# Patient Record
Sex: MALE | Race: BLACK OR AFRICAN AMERICAN | NOT HISPANIC OR LATINO | Employment: FULL TIME | URBAN - METROPOLITAN AREA
[De-identification: names, ages, dates, MRNs, and addresses within clinical notes are randomized per-mention and may not be internally consistent; named-entity substitution may affect disease eponyms.]

---

## 2021-08-19 ENCOUNTER — OFFICE VISIT (OUTPATIENT)
Dept: FAMILY MEDICINE CLINIC | Facility: CLINIC | Age: 36
End: 2021-08-19

## 2021-08-19 VITALS
TEMPERATURE: 98.2 F | BODY MASS INDEX: 38.36 KG/M2 | HEART RATE: 91 BPM | DIASTOLIC BLOOD PRESSURE: 95 MMHG | WEIGHT: 315 LBS | SYSTOLIC BLOOD PRESSURE: 138 MMHG | OXYGEN SATURATION: 98 % | RESPIRATION RATE: 18 BRPM | HEIGHT: 76 IN

## 2021-08-19 DIAGNOSIS — K61.1 PERIRECTAL ABSCESS: Primary | ICD-10-CM

## 2021-08-19 PROCEDURE — 99204 OFFICE O/P NEW MOD 45 MIN: CPT | Performed by: FAMILY MEDICINE

## 2021-08-19 RX ORDER — AMOXICILLIN AND CLAVULANATE POTASSIUM 875; 125 MG/1; MG/1
1 TABLET, FILM COATED ORAL 2 TIMES DAILY
Qty: 20 TABLET | Refills: 0 | Status: SHIPPED | OUTPATIENT
Start: 2021-08-19 | End: 2021-09-02

## 2021-08-19 RX ORDER — AMLODIPINE BESYLATE 5 MG/1
5 TABLET ORAL DAILY
COMMUNITY
Start: 2021-08-04 | End: 2021-09-02 | Stop reason: SDUPTHER

## 2021-08-19 RX ORDER — AMOXICILLIN AND CLAVULANATE POTASSIUM 875; 125 MG/1; MG/1
1 TABLET, FILM COATED ORAL
COMMUNITY
Start: 2021-08-17 | End: 2021-08-19 | Stop reason: SDUPTHER

## 2021-08-19 NOTE — PROGRESS NOTES
"Chief Complaint  Hospital Follow Up Visit    Moshe Escobedo presents to [facility] to follow-up on recent hospital stay.  He was diagnosed with perirectal abscess on August 1.  They incised and drained and kept him in the hospital for several days with systemic infection.  He was released and has been seeing the surgeon periodically.  Surgeon released from care.  Antibiotics continue.  He had no drainage up until today when he noticed some drainage.  No tenderness no fever.  In the hospital his blood pressure was elevated and they started him on amlodipine.  Today is above goal.          Objective   Vital Signs:   Vitals:    08/19/21 1526   BP: 138/95   Pulse: 91   Resp: 18   Temp: 98.2 °F (36.8 °C)   TempSrc: Skin   SpO2: 98%   Weight: (!) 145 kg (320 lb)   Height: 193 cm (76\")        Physical Exam  Constitutional:       Appearance: He is obese. He is not ill-appearing.   Skin:         Neurological:      Mental Status: He is alert.          Result Review :     The following data was reviewed by: Juan Luna MD on 08/19/2021: Hospital admission information from August 1, 2021.  Basic Information  Time seen: Immediately upon arrival.  History source: Patient.  Arrival mode: Private vehicle.  History limitation: None.  Additional information: Chief Complaint from Nursing Triage Note : Chief Complaint  8/1/2021 10:37 EDT Chief Complaint Abscess on buttock .   History of Present Illness  The patient presents with Swelling and pain to right buttock. The onset was 2 days ago. The course/duration of symptoms is constant and worsening. Location: Right Buttock. The character of symptoms is pain, swelling and redness. The degree at onset was minimal. The degree at present is moderate. Risk factors consist of obesity and not diabetes mellitus. Therapy today: none. Associated symptoms: fever, chills, denies chest pain and denies shortness of breath. Patient is a 36-year-old gentleman " with a history of obesity, presents with, redness and pain to his right buttock. No trauma. He has fever and chills. Symptoms began 2 days ago. No known injury.   Review of Systems  Constitutional symptoms: Fever, chills, fatigue.  Skin symptoms: Abscess/cellulitis to right buttock.  Respiratory symptoms: No shortness of breath,  Cardiovascular symptoms: No chest pain,  Gastrointestinal symptoms: No vomiting,  Hematologic/Lymphatic symptoms: Bleeding tendency negative,  Additional review of systems information: All other systems reviewed and otherwise negative.   Health Status  Allergies:   Allergic Reactions (Selected)  No Known Medication Allergies.   Past Medical/ Family/ Social History    Medical history  Reviewed as documented in chart.  Surgical history:   No active procedure history items have been selected or recorded., Reviewed as documented in chart.  Family history:   No family history items have been selected or recorded., Reviewed as documented in chart.  Social history:   Social & Psychosocial Habits    Alcohol  08/01/2021 Alcohol Use History, Social Habits No  Alcohol Use in Last Twelve Months No    Substance Abuse  08/01/2021 Recreational Drug Use History No  Recreational Drug Use Last 12 Months No    Tobacco  08/01/2021 Second Hand Smoke Exposure No  Smokeless Tobacco Use in Last 30 Days No  .  Problem list:   All Problems  No Chronic Problems / Cerner NKP,  Active Problems (1)  No Chronic Problems   , per nurse's notes.   Physical Examination    Vital Signs  Vital Signs/Vital Measures  8/1/2021 10:37 EDT Eve Motor Response Obey commands  Eve Verbal Response Oriented  Eve Eye Opening Response Spontaneous  Gabriels Coma Score 15  Temperature, Fahrenheit 98.3 Deg F  Clinical Temperature, C 36.8 Deg C  Peripheral Pulse Rate 93 bpm  Respiratory Rate 18 Breaths/Min  Systolic Blood Pressure 138 mmHg  Diastolic Blood Pressure 88 mmHg  Oxygen Saturation 100 %  Oxygen Therapy Mode Room air  .  Measurements  8/1/2021 10:37 EDT Height/Length, ENGLISH (ft) 6 ft  Height/Length ENGLISH 3 Inch  CLINICALHEIGHT 190.5 cm  Ideal Body Weight 83 kg  Weight Source, ED Critical estimated dosing weight  Weight English lb 315 lb  CLINICALWEIGHT 143.18 kg  Body Surface Area (BSA) 2.66 m2  Body Mass Index 39.5 kg/m2 HI .  Oxygen Saturation  8/1/2021 10:37 EDT Oxygen Saturation 100 % .  General: Alert, no acute distress.  Skin: Warm, dry.  Head: Normocephalic, atraumatic.  Eye: Pupils are equal, round and reactive to light, extraocular movements are intact.  Ears, nose, mouth and throat: Oral mucosa moist.  Cardiovascular: Regular rate and rhythm, Normal peripheral perfusion.  Respiratory: Lungs are clear to auscultation, respirations are non-labored.  Gastrointestinal: Soft, Nontender.  Musculoskeletal: Normal strength, Large indurated region to buttock.  Neurological: No focal neurological deficit observed.  Psychiatric: Cooperative.   Medical Decision Making  Differential Diagnosis: Abdominal pain, acute myocardial infarction, dehydration, diabetic ketoacidosis, diabetes mellitus, electrolyte imbalance, thyroid disorder, fever, weakness, pneumonia, bronchitis, influenza, urinary tract infection, urosepsis, confusion, viral syndrome, anxiety, depression, allergic reaction, medication reaction, dizziness, heat cramps, failure to thrive.     CT Pelvis W (08/01/2021 11:45)  Result: INDICATION: Right buttock abscess/gluteal infection onset three days ago.TECHNIQUE: CT of the pelvis was performed with intravenous contrast. Isovue [id number] mL was administered intravenously.Automated mA/kV exposure control was utilized and patient examination was performedin strict accordance with principles of ALARA.RADIATION AMOUNT: 1622 mGy-cm.COMPARISON: None available.FINDINGS: Pelvis:There is no bowel wall thickening or obstruction. Appendix is normal. There is nofree fluid. Lymph nodes are not enlarged. Urinary bladder is  unremarkable.Intrapelvic soft tissues are unremarkable. There is soft tissue infiltrative change and inflammatory changes seen in thegluteal folds bilaterally, right greater than left. There is an associated focalarea of fluid 5.3 x 3.5 cm in size on the right side compatible with an abscess. There may be a 2 x 1 cm loculated component extending inferiorly. Small focal areaof ill-defined fluid seen developing in the left gluteal fold area may represent asmall abscess in this region as well.Skeleton:There are no acute fractures. No suspicious bony lesions.IMPRESSION:Inflammatory changes along the gluteal folds, right greater than left compatiblewith abscess development, possibly mildly loculated on the right and developingcomponent on the left side.Signed by Kendall Christianson MD      Procedure  Incision and drainage  Consent: Patient.  Indication: Abscess.  Pre procedure exam: Circulation, motor, and sensory intact.    Description  Location: rt buttock.  Anesthesia: 20 ml, 1% lidocaine, with epinephrine.  Preparation: skin prepped with betadine, skin prepped with chlorhexidine.  Incision: 1.5 cm incision was made, using a # 11 blade scalpel.  Drainage: moderate amount, purulent, bloody.  Irrigation: moderate.  Wound: packing placed in wound cavity.  Post procedure exam: Circulation, motor, sensory examination intact.  Patient tolerated: Well.  Complications: None.  Follow-up: Primary care physician.  Performed by: Self.   Impression and Plan  Diagnosis  Leukocytosis - Discharge, Emergency medicine, Medical  Gluteal abscess - Discharge, Emergency medicine, Medical  Plan  Condition: Stable.  Counseled: Patient, Regarding diagnosis, Regarding diagnostic results, Regarding treatment plan, Patient indicated understanding of instructions.  Notes: Accepted by Dr. Ray.          Assessment and Plan    Diagnoses and all orders for this visit:    1. Perirectal abscess (Primary)  Assessment & Plan:  New problem to this provider.   Some induration.  Continue with Augmentin.  Recheck 2 weeks.    Orders:  -     amoxicillin-clavulanate (AUGMENTIN) 875-125 MG per tablet; Take 1 tablet by mouth 2 (Two) Times a Day for 10 days.  Dispense: 20 tablet; Refill: 0      Follow Up   Return in about 2 weeks (around 9/2/2021) for recheck of current condition.  Patient was given instructions and counseling regarding his condition or for health maintenance advice. Please see specific information pulled into the AVS if appropriate.

## 2021-09-02 ENCOUNTER — OFFICE VISIT (OUTPATIENT)
Dept: FAMILY MEDICINE CLINIC | Facility: CLINIC | Age: 36
End: 2021-09-02

## 2021-09-02 VITALS
RESPIRATION RATE: 16 BRPM | HEART RATE: 136 BPM | TEMPERATURE: 97.8 F | WEIGHT: 315 LBS | OXYGEN SATURATION: 97 % | HEIGHT: 76 IN | BODY MASS INDEX: 38.36 KG/M2 | DIASTOLIC BLOOD PRESSURE: 92 MMHG | SYSTOLIC BLOOD PRESSURE: 141 MMHG

## 2021-09-02 DIAGNOSIS — Z13.6 SCREENING FOR ISCHEMIC HEART DISEASE: ICD-10-CM

## 2021-09-02 DIAGNOSIS — I10 ESSENTIAL HYPERTENSION: ICD-10-CM

## 2021-09-02 DIAGNOSIS — K61.1 PERIRECTAL ABSCESS: Primary | ICD-10-CM

## 2021-09-02 PROCEDURE — 99214 OFFICE O/P EST MOD 30 MIN: CPT | Performed by: FAMILY MEDICINE

## 2021-09-02 RX ORDER — AMOXICILLIN AND CLAVULANATE POTASSIUM 875; 125 MG/1; MG/1
1 TABLET, FILM COATED ORAL 2 TIMES DAILY
COMMUNITY
End: 2021-09-02 | Stop reason: SDUPTHER

## 2021-09-02 RX ORDER — METOPROLOL SUCCINATE 50 MG/1
50 TABLET, EXTENDED RELEASE ORAL DAILY
Qty: 90 TABLET | Refills: 0 | Status: SHIPPED | OUTPATIENT
Start: 2021-09-02 | End: 2021-10-04 | Stop reason: SDUPTHER

## 2021-09-02 RX ORDER — AMLODIPINE BESYLATE 5 MG/1
5 TABLET ORAL NIGHTLY
Qty: 90 TABLET | Refills: 0 | Status: SHIPPED | OUTPATIENT
Start: 2021-09-02 | End: 2021-10-04 | Stop reason: SDUPTHER

## 2021-09-02 RX ORDER — AMOXICILLIN AND CLAVULANATE POTASSIUM 875; 125 MG/1; MG/1
1 TABLET, FILM COATED ORAL 2 TIMES DAILY
Qty: 20 TABLET | Refills: 0 | Status: SHIPPED | OUTPATIENT
Start: 2021-09-02 | End: 2021-09-12

## 2021-09-02 NOTE — PROGRESS NOTES
"Chief Complaint  Abscess (2wk follow up)    Subjective     {Problem List  Visit Diagnosis  Review (Popup)  Encounters  Notes  Medications  Labs  Result Review Imaging  Media :23}     Gregg presents to [facility] to follow-up on rectal abscess.  It is much improved.  Draining stopped a couple days ago.  No fever.  No medication side effects reported.  Skin blood pressure elevated.  Patient does need refill of amlodipine.  Pulse rate slightly above normal.  No chest pains or shortness of breath.  He was working on the yard without difficulty today.          Objective   Vital Signs:   Vitals:    09/02/21 1457   BP: 141/92   Pulse: (!) 136   Resp: 16   Temp: 97.8 °F (36.6 °C)   TempSrc: Skin   SpO2: 97%   Weight: (!) 149 kg (328 lb)   Height: 193 cm (76\")        Physical Exam  Constitutional:       General: He is not in acute distress.  Skin:         Neurological:      Mental Status: He is alert.          Result Review :                Assessment and Plan    Diagnoses and all orders for this visit:    1. Perirectal abscess (Primary)  Assessment & Plan:  Much improved.  Should be resolved with 10 more days antibiotics.  Follow-up as needed.    Orders:  -     amoxicillin-clavulanate (AUGMENTIN) 875-125 MG per tablet; Take 1 tablet by mouth 2 (Two) Times a Day for 10 days.  Dispense: 20 tablet; Refill: 0    2. Essential hypertension  Assessment & Plan:  Hypertension is unchanged.  Medication changes per orders.  And metoprolol to current regimen.  Changed blood pressure dosing to bedtime dosing.  Blood pressure will be reassessed in 4 weeks.    Orders:  -     Comprehensive Metabolic Panel; Future  -     CBC & Differential; Future  -     amLODIPine (NORVASC) 5 MG tablet; Take 1 tablet by mouth Every Night for 90 days.  Dispense: 90 tablet; Refill: 0  -     metoprolol succinate XL (Toprol XL) 50 MG 24 hr tablet; Take 1 tablet by mouth Daily for 90 days.  Dispense: 90 tablet; Refill: " 0    3. Screening for ischemic heart disease  -     Comprehensive Metabolic Panel; Future  -     Lipid Panel; Future  -     CK; Future      Follow Up   Return in about 1 month (around 10/2/2021) for recheck/refill medication.  Patient was given instructions and counseling regarding his condition or for health maintenance advice. Please see specific information pulled into the AVS if appropriate.

## 2021-09-02 NOTE — ASSESSMENT & PLAN NOTE
Hypertension is unchanged.  Medication changes per orders.  And metoprolol to current regimen.  Changed blood pressure dosing to bedtime dosing.  Blood pressure will be reassessed in 4 weeks.

## 2021-09-14 ENCOUNTER — TELEPHONE (OUTPATIENT)
Dept: FAMILY MEDICINE CLINIC | Facility: CLINIC | Age: 36
End: 2021-09-14

## 2021-10-04 ENCOUNTER — OFFICE VISIT (OUTPATIENT)
Dept: FAMILY MEDICINE CLINIC | Facility: CLINIC | Age: 36
End: 2021-10-04

## 2021-10-04 VITALS
TEMPERATURE: 97.2 F | BODY MASS INDEX: 38.36 KG/M2 | HEIGHT: 76 IN | WEIGHT: 315 LBS | SYSTOLIC BLOOD PRESSURE: 132 MMHG | DIASTOLIC BLOOD PRESSURE: 90 MMHG | HEART RATE: 84 BPM | OXYGEN SATURATION: 98 % | RESPIRATION RATE: 16 BRPM

## 2021-10-04 DIAGNOSIS — E78.2 MIXED HYPERLIPIDEMIA: ICD-10-CM

## 2021-10-04 DIAGNOSIS — I10 ESSENTIAL HYPERTENSION: Primary | ICD-10-CM

## 2021-10-04 DIAGNOSIS — K61.1 PERIRECTAL ABSCESS: ICD-10-CM

## 2021-10-04 PROCEDURE — 99214 OFFICE O/P EST MOD 30 MIN: CPT | Performed by: FAMILY MEDICINE

## 2021-10-04 RX ORDER — ROSUVASTATIN CALCIUM 5 MG/1
5 TABLET, COATED ORAL NIGHTLY
Qty: 90 TABLET | Refills: 0 | Status: SHIPPED | OUTPATIENT
Start: 2021-10-04 | End: 2022-01-03

## 2021-10-04 RX ORDER — AMLODIPINE BESYLATE 5 MG/1
5 TABLET ORAL NIGHTLY
Qty: 90 TABLET | Refills: 0 | Status: SHIPPED | OUTPATIENT
Start: 2021-10-04 | End: 2022-08-17 | Stop reason: SDUPTHER

## 2021-10-04 RX ORDER — METOPROLOL SUCCINATE 50 MG/1
50 TABLET, EXTENDED RELEASE ORAL DAILY
Qty: 90 TABLET | Refills: 0 | Status: SHIPPED | OUTPATIENT
Start: 2021-10-04 | End: 2022-08-17 | Stop reason: SDUPTHER

## 2021-10-04 NOTE — PROGRESS NOTES
"Chief Complaint  Abscess and Hypertension    Subjective          Gregg presents to [facility] 2 follow-up on perirectal abscess.  It has cleared up.  He is also here today to recheck blood pressure.  There is improvement.  He is really watching his diet and has started going back to exercising 3 times a week and has had about 8 pounds of weight loss so far.  Labs have been done and are reviewed and are seen below.  His labs are normal except for elevation of cholesterol and triglycerides.          Objective   Vital Signs:   Vitals:    10/04/21 0944 10/04/21 1021   BP: 156/92 132/90   BP Location:  Right arm   Patient Position:  Sitting   Cuff Size:  Large Adult   Pulse: 84    Resp: 16    Temp: 97.2 °F (36.2 °C)    TempSrc: Skin    SpO2: 98%    Weight: (!) 145 kg (319 lb)    Height: 193 cm (76\")         Physical Exam  Vitals reviewed.   Constitutional:       General: He is not in acute distress.     Appearance: He is obese.   Eyes:      General: Lids are normal.      Conjunctiva/sclera: Conjunctivae normal.   Neck:      Vascular: No carotid bruit.      Trachea: No tracheal deviation.   Cardiovascular:      Rate and Rhythm: Normal rate and regular rhythm.      Heart sounds: Normal heart sounds. No murmur heard.     Pulmonary:      Effort: Pulmonary effort is normal.      Breath sounds: Normal breath sounds.   Skin:     General: Skin is warm and dry.      Comments: Perirectal abscess resolved   Neurological:      Mental Status: He is alert. He is not disoriented.   Psychiatric:         Speech: Speech normal.         Behavior: Behavior normal. Behavior is cooperative.          Result Review :{Labs  Result Review  Imaging  Med Tab  Media :23}     The following data was reviewed by: Juan Luna MD on 10/04/2021:  CK (09/16/2021 11:10)  Lipid Panel (09/16/2021 11:10)  CBC & Differential (09/16/2021 11:10)  Comprehensive Metabolic Panel (09/16/2021 11:10)           Assessment and Plan  "   Diagnoses and all orders for this visit:    1. Essential hypertension (Primary)  Assessment & Plan:  Hypertension is improving with treatment.  Continue current treatment regimen.  Blood pressure will be reassessed at the next regular appointment.    Orders:  -     amLODIPine (NORVASC) 5 MG tablet; Take 1 tablet by mouth Every Night for 90 days.  Dispense: 90 tablet; Refill: 0  -     metoprolol succinate XL (Toprol XL) 50 MG 24 hr tablet; Take 1 tablet by mouth Daily for 90 days.  Dispense: 90 tablet; Refill: 0  -     Comprehensive Metabolic Panel; Future  -     CBC & Differential; Future    2. Mixed hyperlipidemia  Assessment & Plan:  Lipid abnormalities are newly identified.  Nutritional counseling was provided. and Pharmacotherapy as ordered. Rosuvastatin 5 mg bedtime dosing.   Lipids will be reassessed in 2 months.    Orders:  -     rosuvastatin (CRESTOR) 5 MG tablet; Take 1 tablet by mouth Every Night for 90 days.  Dispense: 90 tablet; Refill: 0  -     Comprehensive Metabolic Panel; Future  -     CBC & Differential; Future  -     Lipid Panel With / Chol / HDL Ratio; Future  -     CK; Future    3. Perirectal abscess  Assessment & Plan:  resolved        Follow Up   Return in about 2 months (around 12/15/2021) for recheck/refill medication.  Patient was given instructions and counseling regarding his condition or for health maintenance advice. Please see specific information pulled into the AVS if appropriate.

## 2021-10-04 NOTE — ASSESSMENT & PLAN NOTE
Lipid abnormalities are newly identified.  Nutritional counseling was provided. and Pharmacotherapy as ordered. Rosuvastatin 5 mg bedtime dosing.   Lipids will be reassessed in 2 months.

## 2021-12-31 DIAGNOSIS — E78.2 MIXED HYPERLIPIDEMIA: ICD-10-CM

## 2022-01-03 RX ORDER — ROSUVASTATIN CALCIUM 5 MG/1
5 TABLET, COATED ORAL NIGHTLY
Qty: 90 TABLET | Refills: 0 | Status: SHIPPED | OUTPATIENT
Start: 2022-01-03 | End: 2022-08-17 | Stop reason: SDUPTHER

## 2022-08-17 ENCOUNTER — OFFICE VISIT (OUTPATIENT)
Dept: FAMILY MEDICINE CLINIC | Facility: CLINIC | Age: 37
End: 2022-08-17

## 2022-08-17 VITALS
HEART RATE: 88 BPM | OXYGEN SATURATION: 100 % | SYSTOLIC BLOOD PRESSURE: 148 MMHG | RESPIRATION RATE: 16 BRPM | WEIGHT: 312 LBS | DIASTOLIC BLOOD PRESSURE: 100 MMHG | BODY MASS INDEX: 37.99 KG/M2 | HEIGHT: 76 IN | TEMPERATURE: 98.7 F

## 2022-08-17 DIAGNOSIS — E78.2 MIXED HYPERLIPIDEMIA: ICD-10-CM

## 2022-08-17 DIAGNOSIS — Z11.59 NEED FOR HEPATITIS C SCREENING TEST: ICD-10-CM

## 2022-08-17 DIAGNOSIS — I10 ESSENTIAL HYPERTENSION: ICD-10-CM

## 2022-08-17 DIAGNOSIS — Z00.00 ENCOUNTER FOR WELLNESS EXAMINATION IN ADULT: Primary | ICD-10-CM

## 2022-08-17 DIAGNOSIS — L72.9 CYST OF SKIN: ICD-10-CM

## 2022-08-17 PROCEDURE — 99395 PREV VISIT EST AGE 18-39: CPT | Performed by: FAMILY MEDICINE

## 2022-08-17 RX ORDER — ROSUVASTATIN CALCIUM 5 MG/1
5 TABLET, COATED ORAL NIGHTLY
Qty: 90 TABLET | Refills: 1 | Status: SHIPPED | OUTPATIENT
Start: 2022-08-17 | End: 2023-02-13

## 2022-08-17 RX ORDER — AMLODIPINE BESYLATE 5 MG/1
5 TABLET ORAL NIGHTLY
Qty: 90 TABLET | Refills: 1 | Status: SHIPPED | OUTPATIENT
Start: 2022-08-17 | End: 2023-02-14

## 2022-08-17 RX ORDER — METOPROLOL SUCCINATE 100 MG/1
100 TABLET, EXTENDED RELEASE ORAL NIGHTLY
Qty: 90 TABLET | Refills: 1 | Status: SHIPPED | OUTPATIENT
Start: 2022-08-17 | End: 2023-02-14

## 2022-08-17 RX ORDER — ACETAMINOPHEN 500 MG
1000 TABLET ORAL 3 TIMES DAILY PRN
Qty: 180 TABLET | Refills: 11
Start: 2022-08-17 | End: 2023-08-17

## 2022-08-17 NOTE — PROGRESS NOTES
"Chief Complaint  Hypertension (Med refills ) and Rash (On neck x 4 years, intermittently )    Subjective          Gregg presents to [facility]  To refill medicines.  He is also here for annual wellness visit.  Preventative measures discussed during today's visit. Overall he feels well. Has been more focused on health over the past month.  Patient developed COVID earlier in the year and is mostly recovered from that infection.  Review of Systems   Constitutional: Negative for fatigue.   Respiratory: Negative for cough and shortness of breath.    Cardiovascular: Negative for chest pain and palpitations.   Psychiatric/Behavioral: Negative for dysphoric mood. The patient is not nervous/anxious.    All other systems reviewed and are negative.   Weight in (lb) to have BMI = 25: 205   Vision exam: not up to date  Dental exam: not up to date  Exercise:  3 times per week  Regular use of seat belts: yes  Objective   Vital Signs:   Vitals:    08/17/22 1258   BP: 148/100   Pulse: 88   Resp: 16   Temp: 98.7 °F (37.1 °C)   SpO2: 100%   Weight: (!) 142 kg (312 lb)   Height: 193 cm (76\")        Class 2 Severe Obesity (BMI >=35 and <=39.9). Obesity-related health conditions include the following: hypertension. Obesity is unchanged. BMI is is above average; BMI management plan is completed. We discussed portion control and increasing exercise.        Physical Exam  Constitutional:       Appearance: Normal appearance. He is well-developed. He is obese.   HENT:      Head: Normocephalic.      Right Ear: Hearing, tympanic membrane and external ear normal.      Left Ear: Hearing, tympanic membrane and external ear normal.   Eyes:      General: Lids are normal.      Conjunctiva/sclera: Conjunctivae normal.      Pupils: Pupils are equal, round, and reactive to light.      Funduscopic exam:     Right eye: No AV nicking or papilledema.         Left eye: No AV nicking or papilledema.   Neck:      Thyroid: No " thyroid mass or thyromegaly.      Vascular: No carotid bruit or JVD.      Trachea: Trachea normal.   Cardiovascular:      Rate and Rhythm: Normal rate and regular rhythm.      Pulses: Normal pulses.      Heart sounds: Normal heart sounds. No murmur heard.  Pulmonary:      Effort: Pulmonary effort is normal.      Breath sounds: Normal breath sounds.   Chest:   Breasts:      Right: No supraclavicular adenopathy.      Left: No supraclavicular adenopathy.       Abdominal:      General: Bowel sounds are normal.      Palpations: Abdomen is soft.      Tenderness: There is no abdominal tenderness.   Musculoskeletal:         General: Normal range of motion.      Cervical back: Normal range of motion and neck supple.      Lumbar back: No bony tenderness.   Lymphadenopathy:      Cervical: No cervical adenopathy.      Upper Body:      Right upper body: No supraclavicular adenopathy.      Left upper body: No supraclavicular adenopathy.   Skin:     General: Skin is warm and dry.      Findings: No rash.      Nails: There is no clubbing.   Neurological:      Mental Status: He is alert and oriented to person, place, and time.      Cranial Nerves: No cranial nerve deficit.      Deep Tendon Reflexes:      Reflex Scores:       Patellar reflexes are 2+ on the right side and 2+ on the left side.  Psychiatric:         Speech: Speech normal.         Behavior: Behavior normal.         Thought Content: Thought content normal.         Judgment: Judgment normal.          Result Review :                Assessment and Plan    Diagnoses and all orders for this visit:    1. Encounter for wellness examination in adult (Primary)  Comments:  Covid immunization encouraged. Diet and exercise to lose weight    2. Essential hypertension  Assessment & Plan:  Hypertension is worsening.  Medication changes per orders.  Change blood pressure dosing to bedtime dosing.  Continue amlodipine 5 mg.  Increase metoprolol to 100 mg.  Blood pressure will be  reassessed in 3 months.    Orders:  -     amLODIPine (NORVASC) 5 MG tablet; Take 1 tablet by mouth Every Night for 180 days.  Dispense: 90 tablet; Refill: 1  -     metoprolol succinate XL (Toprol XL) 100 MG 24 hr tablet; Take 1 tablet by mouth Every Night for 180 days.  Dispense: 90 tablet; Refill: 1  -     Comprehensive Metabolic Panel; Future  -     CBC & Differential; Future  -     TSH; Future    3. Mixed hyperlipidemia  Assessment & Plan:  Condition is stable. The current medical regimen is effective;  continue present plan and medications.  Continue with diet and exercise to lower cholesterol.  Recheck labs 3 months.    Orders:  -     rosuvastatin (CRESTOR) 5 MG tablet; Take 1 tablet by mouth Every Night for 180 days.  Dispense: 90 tablet; Refill: 1  -     Lipid Panel; Future  -     CK; Future    4. Need for hepatitis C screening test  -     Hepatitis C Antibody; Future    5. Cysts of skin  Assessment & Plan:  Refer to dermatology    Orders:  -     Ambulatory Referral to Dermatology    Other orders  -     acetaminophen (TYLENOL) 500 MG tablet; Take 2 tablets by mouth 3 (Three) Times a Day As Needed for Mild Pain  or Moderate Pain . Do not exceed 3000 mg daily  Dispense: 180 tablet; Refill: 11      Follow Up   Return in about 3 months (around 11/17/2022) for BP Check.  Patient was given instructions and counseling regarding his condition or for health maintenance advice. Please see specific information pulled into the AVS if appropriate.

## 2022-08-17 NOTE — ASSESSMENT & PLAN NOTE
Hypertension is worsening.  Medication changes per orders.  Change blood pressure dosing to bedtime dosing.  Continue amlodipine 5 mg.  Increase metoprolol to 100 mg.  Blood pressure will be reassessed in 3 months.

## 2022-08-17 NOTE — ASSESSMENT & PLAN NOTE
Condition is stable. The current medical regimen is effective;  continue present plan and medications.  Continue with diet and exercise to lower cholesterol.  Recheck labs 3 months.

## 2023-02-14 DIAGNOSIS — I10 ESSENTIAL HYPERTENSION: ICD-10-CM

## 2023-02-14 RX ORDER — AMLODIPINE BESYLATE 5 MG/1
5 TABLET ORAL NIGHTLY
Qty: 30 TABLET | Refills: 0 | Status: SHIPPED | OUTPATIENT
Start: 2023-02-14 | End: 2023-08-13

## 2023-02-14 RX ORDER — METOPROLOL SUCCINATE 100 MG/1
100 TABLET, EXTENDED RELEASE ORAL NIGHTLY
Qty: 30 TABLET | Refills: 0 | Status: SHIPPED | OUTPATIENT
Start: 2023-02-14 | End: 2023-08-13

## 2023-02-14 NOTE — TELEPHONE ENCOUNTER
Rx Refill Note  Requested Prescriptions     Pending Prescriptions Disp Refills   • amLODIPine (NORVASC) 5 MG tablet [Pharmacy Med Name: AMLODIPINE BESYLATE 5 MG TAB] 90 tablet 1     Sig: TAKE 1 TABLET BY MOUTH EVERY NIGHT  DAYS.   • metoprolol succinate XL (TOPROL-XL) 100 MG 24 hr tablet [Pharmacy Med Name: METOPROLOL SUCC  MG TAB] 90 tablet 1     Sig: TAKE 1 TABLET BY MOUTH EVERY NIGHT  DAYS.      Last office visit with prescribing clinician: 8/17/2022   Next office visit with prescribing clinician: Visit date not found     Sent a 30 day RX. Pt is needing an appointment.     Okay for the HUB to relay message    ER physician

## 2023-10-26 ENCOUNTER — OFFICE VISIT (OUTPATIENT)
Dept: FAMILY MEDICINE CLINIC | Facility: CLINIC | Age: 38
End: 2023-10-26
Payer: COMMERCIAL

## 2023-10-26 VITALS
TEMPERATURE: 97.7 F | HEART RATE: 91 BPM | SYSTOLIC BLOOD PRESSURE: 144 MMHG | RESPIRATION RATE: 16 BRPM | BODY MASS INDEX: 38.36 KG/M2 | HEIGHT: 76 IN | OXYGEN SATURATION: 99 % | WEIGHT: 315 LBS | DIASTOLIC BLOOD PRESSURE: 90 MMHG

## 2023-10-26 DIAGNOSIS — I10 ESSENTIAL HYPERTENSION: ICD-10-CM

## 2023-10-26 DIAGNOSIS — M25.551 RIGHT HIP PAIN: Primary | ICD-10-CM

## 2023-10-26 DIAGNOSIS — L02.92 FURUNCLE: ICD-10-CM

## 2023-10-26 DIAGNOSIS — E78.2 MIXED HYPERLIPIDEMIA: ICD-10-CM

## 2023-10-26 DIAGNOSIS — Z11.59 NEED FOR HEPATITIS C SCREENING TEST: ICD-10-CM

## 2023-10-26 PROCEDURE — 99214 OFFICE O/P EST MOD 30 MIN: CPT | Performed by: FAMILY MEDICINE

## 2023-10-26 RX ORDER — AMLODIPINE BESYLATE 10 MG/1
10 TABLET ORAL NIGHTLY
Qty: 90 TABLET | Refills: 1 | Status: SHIPPED | OUTPATIENT
Start: 2023-10-26 | End: 2024-04-23

## 2023-10-26 RX ORDER — ROSUVASTATIN CALCIUM 5 MG/1
5 TABLET, COATED ORAL NIGHTLY
Qty: 90 TABLET | Refills: 1 | Status: SHIPPED | OUTPATIENT
Start: 2023-10-26 | End: 2024-04-23

## 2023-10-26 RX ORDER — METOPROLOL SUCCINATE 100 MG/1
100 TABLET, EXTENDED RELEASE ORAL NIGHTLY
Qty: 90 TABLET | Refills: 1 | Status: SHIPPED | OUTPATIENT
Start: 2023-10-26 | End: 2024-04-23

## 2023-10-26 NOTE — PROGRESS NOTES
"Chief Complaint  Hip Pain and Leg Pain (Right side )    Subjective        Hip Pain     Leg Pain        Gregg presents to [facility] for            Objective   Vital Signs:   Vitals:    10/26/23 1601   BP: 144/90   BP Location: Left arm   Patient Position: Sitting   Pulse: 91   Resp: 16   Temp: 97.7 °F (36.5 °C)   TempSrc: Oral   SpO2: 99%   Weight: (!) 152 kg (334 lb 6.4 oz)   Height: 193 cm (76\")            10/26/2023     4:03 PM   PHQ-2/PHQ-9 Depression Screening   Little Interest or Pleasure in Doing Things 0-->not at all   Feeling Down, Depressed or Hopeless 0-->not at all   PHQ-9: Brief Depression Severity Measure Score 0       Class 3 Severe Obesity (BMI >=40). Obesity-related health conditions include the following: hypertension and dyslipidemias. Obesity is worsening. BMI is is above average; BMI management plan is completed. We discussed low calorie, low carb based diet program, portion control, and increasing exercise.        Physical Exam  Vitals reviewed.   Constitutional:       General: He is not in acute distress.     Appearance: He is obese.   Eyes:      General: Lids are normal.      Conjunctiva/sclera: Conjunctivae normal.   Neck:      Vascular: No carotid bruit.      Trachea: No tracheal deviation.   Cardiovascular:      Rate and Rhythm: Normal rate and regular rhythm.      Heart sounds: Normal heart sounds. No murmur heard.  Pulmonary:      Effort: Pulmonary effort is normal.      Breath sounds: Normal breath sounds.   Skin:     General: Skin is warm and dry.   Neurological:      Mental Status: He is alert. He is not disoriented.   Psychiatric:         Speech: Speech normal.         Behavior: Behavior normal. Behavior is cooperative.          Result Review :                Assessment and Plan    Diagnoses and all orders for this visit:    1. Right hip pain (Primary)  -     Ambulatory Referral to Orthopedic Surgery    2. Essential hypertension  Assessment & " Plan:  Hypertension is worsening.  Medication changes per orders.  Blood pressure will be reassessed  in Deceber .    Orders:  -     amLODIPine (NORVASC) 10 MG tablet; Take 1 tablet by mouth Every Night for 180 days.  Dispense: 90 tablet; Refill: 1  -     metoprolol succinate XL (TOPROL-XL) 100 MG 24 hr tablet; Take 1 tablet by mouth Every Night for 180 days.  Dispense: 90 tablet; Refill: 1  -     Comprehensive Metabolic Panel; Future  -     CBC & Differential; Future  -     TSH; Future    3. Mixed hyperlipidemia  Assessment & Plan:  Stable pending lab results. Continue same medications.     Orders:  -     rosuvastatin (CRESTOR) 5 MG tablet; Take 1 tablet by mouth Every Night for 180 days.  Dispense: 90 tablet; Refill: 1  -     Lipid Panel With / Chol / HDL Ratio; Future  -     CK; Future    4. Need for hepatitis C screening test  -     Hepatitis C Antibody; Future    5. Furuncle  -     Ambulatory Referral to Dermatology        Follow Up   Return in about 6 weeks (around 12/7/2023) for recheck/refill medication.  Patient was given instructions and counseling regarding his condition or for health maintenance advice. Please see specific information pulled into the AVS if appropriate.

## 2023-10-26 NOTE — ASSESSMENT & PLAN NOTE
Hypertension is worsening.  Medication changes per orders.  Blood pressure will be reassessed  in Deceber .

## 2023-11-13 ENCOUNTER — OFFICE VISIT (OUTPATIENT)
Dept: ORTHOPEDIC SURGERY | Facility: CLINIC | Age: 38
End: 2023-11-13
Payer: COMMERCIAL

## 2023-11-13 VITALS — TEMPERATURE: 97.3 F | BODY MASS INDEX: 38.36 KG/M2 | HEIGHT: 76 IN | WEIGHT: 315 LBS

## 2023-11-13 DIAGNOSIS — M54.31 SCIATICA OF RIGHT SIDE: ICD-10-CM

## 2023-11-13 DIAGNOSIS — M70.61 GREATER TROCHANTERIC BURSITIS OF RIGHT HIP: Primary | ICD-10-CM

## 2023-11-13 DIAGNOSIS — R52 PAIN: ICD-10-CM

## 2023-11-13 PROCEDURE — 73502 X-RAY EXAM HIP UNI 2-3 VIEWS: CPT | Performed by: ORTHOPAEDIC SURGERY

## 2023-11-13 PROCEDURE — 99203 OFFICE O/P NEW LOW 30 MIN: CPT | Performed by: ORTHOPAEDIC SURGERY

## 2023-11-13 NOTE — PROGRESS NOTES
General Exam    Patient: Gregg Zamorano    YOB: 1985    Medical Record Number: [id number]    Chief Complaints: Right hip/buttock pain    History of Present Illness:     38 y.o. male patient who presents for right hip and buttock pain.  Patient states he had symptoms for about 6 months.  States symptoms are more laterally and posteriorly based.  Denies any groin pain.  States that prolonged sitting he starts having some symptoms going down the back of his leg once he stands he has a little irritation but then feels better.  Increase standing or stairs do bother the lateral side of his hip.  Ibuprofen helps.    Patient does have a history of a gluteal abscess on the same side that did require hospitalization however he states this is not the same location nor symptoms.    Denies any numbness or tingling.  Denies any fevers, cough or shortness of breath.    Allergies: No Known Allergies    Home Medications:      Current Outpatient Medications:     amLODIPine (NORVASC) 10 MG tablet, Take 1 tablet by mouth Every Night for 180 days., Disp: 90 tablet, Rfl: 1    metoprolol succinate XL (TOPROL-XL) 100 MG 24 hr tablet, Take 1 tablet by mouth Every Night for 180 days., Disp: 90 tablet, Rfl: 1    rosuvastatin (CRESTOR) 5 MG tablet, Take 1 tablet by mouth Every Night for 180 days., Disp: 90 tablet, Rfl: 1    Past Medical History:   Diagnosis Date    Ankle sprain     Hyperlipidemia     Hypertension     Low back strain     Perirectal abscess 08/19/2021    Tennis elbow        No past surgical history on file.    Social History     Occupational History    Not on file   Tobacco Use    Smoking status: Former     Types: Cigars, Electronic Cigarette    Smokeless tobacco: Never   Vaping Use    Vaping Use: Never used   Substance and Sexual Activity    Alcohol use: Yes     Alcohol/week: 6.0 standard drinks of alcohol     Types: 6 Cans of beer per week     Comment: Once a month    Drug use: Not Currently     Types: Marijuana     "Sexual activity: Yes     Partners: Female     Birth control/protection: None      Social History     Social History Narrative    Not on file       Family History   Adopted: Yes       Review of Systems:      Constitutional: Denies fever, shaking or chills         All other pertinent positives and negatives as noted above in HPI.    Physical Exam: 38 y.o. male    Vitals:    11/13/23 0812   Temp: 97.3 °F (36.3 °C)   Weight: (!) 153 kg (337 lb 3.2 oz)   Height: 193 cm (76\")       General:  Patient is awake and alert.  Appears in no acute distress or discomfort.      Musculoskeletal/Extremities:    Right lower extremity examined no significant tenderness over the greater trochanter although he states this is location of symptoms when they do occur.  Gentle hip range of motion does cause irritation of this area.  Positive straight leg raise.  Motor and sensory intact distally.         Radiology:       AP and lateral films of the right hip taken and reviewed to evaluate the patient's complaint/s.    Imaging demonstrates minimal changes of the hip joint overall space appears preserved.  No acute fractures appreciated.     No imaging for comparison.    Assessment: Right hip greater trochanteric bursitis, sciatica      Plan:      I discussed the findings the patient recommend conservative treatment at this time consisting of rest, ice, activity modification, anti-inflammatories, formal physical therapy.  Depending how and how he is doing in the next 3 to 4 weeks injection could be warranted versus steroid Dosepak.  Otherwise I did tell him symptoms can take 6 to 8 weeks to resolve and can return thus the importance of therapy.           We will plan for follow up as needed.    All questions were answered.  Patient understands and agrees with the plan.    Atif Hernandez MD    11/13/2023    CC to Juan Luna MD        "

## 2023-11-20 ENCOUNTER — PATIENT ROUNDING (BHMG ONLY) (OUTPATIENT)
Dept: ORTHOPEDIC SURGERY | Facility: CLINIC | Age: 38
End: 2023-11-20
Payer: COMMERCIAL

## 2023-11-20 NOTE — PROGRESS NOTES
A Rank By Search Message has been sent to the patient for PATIENT ROUNDING with [facility]

## 2023-11-28 ENCOUNTER — TREATMENT (OUTPATIENT)
Dept: PHYSICAL THERAPY | Facility: CLINIC | Age: 38
End: 2023-11-28
Payer: COMMERCIAL

## 2023-11-28 DIAGNOSIS — M70.61 GREATER TROCHANTERIC BURSITIS OF RIGHT HIP: Primary | ICD-10-CM

## 2023-11-28 DIAGNOSIS — M54.31 SCIATICA OF RIGHT SIDE: ICD-10-CM

## 2023-11-28 PROCEDURE — 97161 PT EVAL LOW COMPLEX 20 MIN: CPT | Performed by: PHYSICAL THERAPIST

## 2023-11-28 PROCEDURE — 97530 THERAPEUTIC ACTIVITIES: CPT | Performed by: PHYSICAL THERAPIST

## 2023-11-28 PROCEDURE — 97110 THERAPEUTIC EXERCISES: CPT | Performed by: PHYSICAL THERAPIST

## 2023-11-28 NOTE — PROGRESS NOTES
Physical Therapy Initial Evaluation and Plan of Care  [address] [place] [address], [address]  [place] [address]    Patient: Gregg Zamorano   : 1985  Diagnosis/ICD-10 Code:  Greater trochanteric bursitis of right hip [M70.61]  Referring practitioner: Atif Hernandez MD  Date of Initial Visit: 2023  Today's Date: 2023  Patient seen for 1 session         Visit Diagnoses:    ICD-10-CM ICD-9-CM   1. Greater trochanteric bursitis of right hip  M70.61 726.5   2. Sciatica of right side  M54.31 724.3         Subjective Questionnaire: LEFS: 53      Subjective Evaluation    History of Present Illness  Mechanism of injury: Patient reports that his right hip has bothered him since April or May.  Thought that it was a strain and let it go, but it didn't get any better.  Saw his PCP, then saw the ortho, who referred him to PT.  Patient denies any injury in April and states that the pain came out of no where.  States that the hip hasn't gotten any better since the onset.    Denies any previous injuries or surgeries to the hip.      Patient Occupation: [employer]  Current pain ratin  At worst pain ratin  Location: right lateral hip extending to the hamstring  Quality: sharp  Alleviating factors: once he gets into position, the pain goes away.  Aggravating factors: standing (sitting down, rolling over in bed)  Progression: no change    Diagnostic Tests  X-ray: normal             Objective          Postural Observations    Additional Postural Observation Details  Normal sit to stand.  Patient ambulates with a minimal antalgic gait pattern.    Palpation     Additional Palpation Details  No TTP present.    Active Range of Motion     Lumbar   Flexion: Active lumbar flexion: about 50. with pain  Extension: Active lumbar extension: WNL.   Left lateral flexion: Active left lumbar lateral flexion: WNL.   Right lateral flexion: Active right lumbar lateral flexion: WNL.     Right Hip   Flexion: Right hip active  flexion: WNL.   Abduction: Right hip active abduction: WNL.   Adduction: Right hip active adduction: WNL.     Strength/Myotome Testing     Right Hip   Planes of Motion   Flexion: 4+  Abduction: 5  Adduction: 5    Right Knee   Extension: 4    Additional Strength Details  Pain with knee extension MMT.    Tests     Additional Tests Details  - SLR on the right  + CORIN on the right for hip pain          Assessment & Plan       Assessment  Impairments: abnormal or restricted ROM, activity intolerance, impaired physical strength, lacks appropriate home exercise program and pain with function   Assessment details: Patient presents with c/o pain, limited lumbar flexion, decreased knee extension MMT, positive special testing and a minimal antalgic gait pattern which is limiting his ability to perform activities.  Barriers to therapy: none  Prognosis: good  Prognosis details: STG's to be met by 2 weeks  1)  Independent with HEP  2)  Decrease pain by 50% or more  3)  AROM WNL for lumbar flexion    LTG's to be met by 4 weeks  1)  Independent with HEP progression  2)  Decrease pain by 75% or more  3)  Increase strength for right knee extension to 4+/5  4)  Negative special testing  5)  Patient to ambulate with a normal gait pattern  6)  Patient to have no pain with transitional movements        Plan  Therapy options: will be seen for skilled therapy services  Planned therapy interventions: strengthening, stretching, therapeutic activities and home exercise program  Frequency: 1x week  Duration in weeks: 4  Treatment plan discussed with: patient            Timed:         Manual Therapy:    0     mins  74919;    Therapeutic Exercise:    8     mins  54208;    Neuromuscular Felisha:    0    mins  91115;    Therapeutic Activity:     8     mins  68916;     Gait Trainin     mins  91826;     Ultrasound:     0     mins  39631;          Un-Timed:  Electrical Stimulation:    0     mins  40399 ( );    Low Eval     14     Mins   51221  Mod Eval     0     Mins  77840  High Eval                       0     Mins  44204        Timed Treatment:   16   mins   Total Treatment:     30   mins          PT: Luis Alfredo Parrish, PT     [place] License [id number]  Electronically signed by Luis Alfredo Parrish PT, 11/28/23, 8:11 AM EST    Certification Period: 11/28/2023 thru 2/25/2024  I certify that the therapy services are furnished while this patient is under my care.  The services outlined above are required by this patient, and will be reviewed every 90 days.    Atif Hernandez Md  [address] [place] [address]   NPI: [id number]      Luis Alfredo Parrish PT   License number: [id number]        Physician Signature:__________________________________________________    PHYSICIAN: Atif Hernandez MD      DATE:     Please sign and return via fax to [email or web address] . Thank you, [facility].

## 2023-12-04 ENCOUNTER — OFFICE VISIT (OUTPATIENT)
Dept: FAMILY MEDICINE CLINIC | Facility: CLINIC | Age: 38
End: 2023-12-04
Payer: COMMERCIAL

## 2023-12-04 VITALS
HEART RATE: 82 BPM | WEIGHT: 315 LBS | OXYGEN SATURATION: 98 % | TEMPERATURE: 97.4 F | RESPIRATION RATE: 16 BRPM | SYSTOLIC BLOOD PRESSURE: 134 MMHG | BODY MASS INDEX: 38.36 KG/M2 | DIASTOLIC BLOOD PRESSURE: 84 MMHG | HEIGHT: 76 IN

## 2023-12-04 DIAGNOSIS — I10 ESSENTIAL HYPERTENSION: ICD-10-CM

## 2023-12-04 DIAGNOSIS — R74.8 ELEVATED CPK: Primary | ICD-10-CM

## 2023-12-04 DIAGNOSIS — E78.2 MIXED HYPERLIPIDEMIA: ICD-10-CM

## 2023-12-04 PROCEDURE — 99214 OFFICE O/P EST MOD 30 MIN: CPT | Performed by: FAMILY MEDICINE

## 2023-12-04 RX ORDER — ICOSAPENT ETHYL 1000 MG/1
2 CAPSULE ORAL 2 TIMES DAILY WITH MEALS
Qty: 360 CAPSULE | Refills: 2 | Status: SHIPPED | OUTPATIENT
Start: 2023-12-04 | End: 2024-08-30

## 2023-12-04 RX ORDER — METOPROLOL SUCCINATE 100 MG/1
100 TABLET, EXTENDED RELEASE ORAL NIGHTLY
Qty: 90 TABLET | Refills: 1 | Status: SHIPPED | OUTPATIENT
Start: 2023-12-04 | End: 2024-06-01

## 2023-12-04 RX ORDER — AMLODIPINE BESYLATE 10 MG/1
10 TABLET ORAL NIGHTLY
Qty: 90 TABLET | Refills: 1 | Status: SHIPPED | OUTPATIENT
Start: 2023-12-04 | End: 2024-06-01

## 2023-12-04 NOTE — PROGRESS NOTES
"Chief Complaint  Follow-up and Hip Pain (6 week follow up for right hip pain /)    Subjective        HPI   Gregg presents to [facility] for  lab review and to refill current medications. Overall he feels well. No medication side effects are reported.  Patient has had periodic episodes of muscle cramps.  Worsened with long shifts at work.  Labs reviewed showed elevation of CPK.  Sciatic pain being treated currently with physical therapy.  Slight improvement reported        Objective   Vital Signs:   Vitals:    12/04/23 0844   BP: 134/84   BP Location: Left arm   Patient Position: Sitting   Pulse: 82   Resp: 16   Temp: 97.4 °F (36.3 °C)   TempSrc: Oral   SpO2: 98%   Weight: (!) 153 kg (337 lb 9.6 oz)   Height: 193 cm (76\")            10/26/2023     4:03 PM   PHQ-2/PHQ-9 Depression Screening   Little Interest or Pleasure in Doing Things 0-->not at all   Feeling Down, Depressed or Hopeless 0-->not at all   PHQ-9: Brief Depression Severity Measure Score 0                 Physical Exam  Vitals reviewed.   Constitutional:       General: He is not in acute distress.  Eyes:      General: Lids are normal.      Conjunctiva/sclera: Conjunctivae normal.   Neck:      Vascular: No carotid bruit.      Trachea: No tracheal deviation.   Cardiovascular:      Rate and Rhythm: Normal rate and regular rhythm.      Heart sounds: Normal heart sounds. No murmur heard.  Pulmonary:      Effort: Pulmonary effort is normal.      Breath sounds: Normal breath sounds.   Skin:     General: Skin is warm and dry.   Neurological:      Mental Status: He is alert. He is not disoriented.   Psychiatric:         Speech: Speech normal.         Behavior: Behavior normal. Behavior is cooperative.          Result Review :     The following data was reviewed by: Juan Luna MD on 12/04/2023:  Hepatitis C Antibody (11/28/2023 09:26)  TSH (11/28/2023 09:26)  CK (11/28/2023 09:26)  Lipid Panel With / Chol / HDL Ratio (11/28/2023 " 09:26)  CBC & Differential (11/28/2023 09:26)  Comprehensive Metabolic Panel (11/28/2023 09:26)         Assessment and Plan    Assessment & Plan  Essential hypertension  Blood pressure improved.  Continue same medicine.  Mixed hyperlipidemia  Lipids controlled.  Due to elevation of CPK, and ongoing elevation of triglycerides will begin Vascepa 2 g twice daily.  We will stop rosuvastatin.  Elevated CPK  Stop rosuvastatin.  Recheck labs in 6 months.     New Medications Ordered This Visit   Medications    amLODIPine (NORVASC) 10 MG tablet     Sig: Take 1 tablet by mouth Every Night for 180 days.     Dispense:  90 tablet     Refill:  1     Please place this prescription on file until needed by the patient.    metoprolol succinate XL (TOPROL-XL) 100 MG 24 hr tablet     Sig: Take 1 tablet by mouth Every Night for 180 days.     Dispense:  90 tablet     Refill:  1     Please place this prescription on file until needed by the patient.    icosapent ethyl (Vascepa) 1 g capsule capsule     Sig: Take 2 g by mouth 2 (Two) Times a Day With Meals for 270 days.     Dispense:  360 capsule     Refill:  2      Orders Placed This Encounter   Procedures    Comprehensive Metabolic Panel    CK    Lipid Panel With / Chol / HDL Ratio    CBC & Differential        Follow Up   Return in about 6 months (around 6/4/2024) for recheck/refill medication.  Patient was given instructions and counseling regarding his condition or for health maintenance advice. Please see specific information pulled into the AVS if appropriate.

## 2023-12-04 NOTE — PROGRESS NOTES
"Chief Complaint  Follow-up and Hip Pain (6 week follow up for right hip pain /)    Subjective     {Problem List  Visit Diagnosis  Review (Popup)  Encounters  Notes  Medications  Labs  Result Review Imaging  Media :23}   ART Escobedo presents to [facility] for  lab review and to refill current medications.  No medication side effects are reported.           Objective   Vital Signs:   Vitals:    12/04/23 0844   BP: 134/84   BP Location: Left arm   Patient Position: Sitting   Pulse: 82   Resp: 16   Temp: 97.4 °F (36.3 °C)   TempSrc: Oral   SpO2: 98%   Weight: (!) 153 kg (337 lb 9.6 oz)   Height: 193 cm (76\")            10/26/2023     4:03 PM   PHQ-2/PHQ-9 Depression Screening   Little Interest or Pleasure in Doing Things 0-->not at all   Feeling Down, Depressed or Hopeless 0-->not at all   PHQ-9: Brief Depression Severity Measure Score 0                 Physical Exam   {DIABETIC FOOT EXAM FOR  (Optional):17599::\"Diabetic Foot Exam Performed\":0}  Result Review :{Labs  Result Review  Imaging  Med Tab  Media :23}     {The following data was reviewed by (Optional):16429}           Assessment and Plan {Wrapup  Problem List  Visit Diagnosis  ROS  Review (Popup)  Health Maintenance  Quality  BestPractice  Medications  SmartSets  SnapShot Encounters  Media :23}   Assessment & Plan       No orders of the defined types were placed in this encounter.         {Time Spent (Optional):02043}  Follow Up {Wrapup  Review (Popup  Communications :23}  No follow-ups on file.  Patient was given instructions and counseling regarding his condition or for health maintenance advice. Please see specific information pulled into the AVS if appropriate.     "

## 2023-12-04 NOTE — PATIENT INSTRUCTIONS
"Fat and Cholesterol Restricted Diet  Getting too much fat and cholesterol in your diet may cause health problems. Following this diet helps keep your fat and cholesterol at normal levels. This can keep you from getting sick.  WHAT TYPES OF FAT SHOULD I CHOOSE?  Choose monosaturated and polyunsaturated fats. These are found in foods such as olive oil, canola oil, flaxseeds, walnuts, almonds, and seeds.  Eat more omega-3 fats. Good choices include salmon, mackerel, sardines, tuna, flaxseed oil, and ground flaxseeds.  Limit saturated fats. These are in animal products such as meats, butter, and cream. They can also be in plant products such as palm oil, palm kernel oil, and coconut oil.      Avoid foods with partially hydrogenated oils in them. These contain trans fats. Examples of foods that have trans fats are stick margarine, some tub margarines, cookies, crackers, and other baked goods.  WHAT GENERAL GUIDELINES DO I NEED TO FOLLOW?    Check food labels. Look for the words \"trans fat\" and \"saturated fat.\"  When preparing a meal:  Fill half of your plate with vegetables and green salads.  Fill one fourth of your plate with whole grains. Look for the word \"whole\" as the first word in the ingredient list.  Fill one fourth of your plate with lean protein foods.  Limit fruit to two servings a day. Choose fruit instead of juice.  Eat more foods with soluble fiber. Examples of foods with this type of fiber are apples, broccoli, carrots, beans, peas, and barley. Try to get 20-30 g (grams) of fiber per day.  Eat more home-cooked foods. Eat less at restaurants and buffets.  Limit or avoid alcohol.  Limit foods high in starch and sugar.Fat and Cholesterol Restricted Eating Plan  Getting too much fat and cholesterol in your diet may cause health problems. Choosing the right foods helps keep your fat and cholesterol at normal levels. This can keep you from getting certain diseases.  Your doctor may recommend an eating plan that " "includes:  Total fat: ______% or less of total calories a day.  Saturated fat: ______% or less of total calories a day.  Cholesterol: less than _________mg a day.  Fiber: ______g a day.  What are tips for following this plan?  Meal planning  At meals, divide your plate into four equal parts:  Fill one-half of your plate with vegetables and green salads.  Fill one-fourth of your plate with whole grains.  Fill one-fourth of your plate with low-fat (lean) protein foods.  Eat fish that is high in omega-3 fats at least two times a week. This includes mackerel, tuna, sardines, and salmon.  Eat foods that are high in fiber, such as whole grains, beans, apples, broccoli, carrots, peas, and barley.  General tips    Work with your doctor to lose weight if you need to.  Avoid:  Foods with added sugar.  Fried foods.  Foods with partially hydrogenated oils.  Limit alcohol intake to no more than 1 drink a day for nonpregnant women and 2 drinks a day for men. One drink equals 12 oz of beer, 5 oz of wine, or 1½ oz of hard liquor.    Reading food labels  Check food labels for:  Trans fats.  Partially hydrogenated oils.  Saturated fat (g) in each serving.  Cholesterol (mg) in each serving.  Fiber (g) in each serving.  Choose foods with healthy fats, such as:  Monounsaturated fats.  Polyunsaturated fats.  Omega-3 fats.  Choose grain products that have whole grains. Look for the word \"whole\" as the first word in the ingredient list.  Cooking  Cook foods using low-fat methods. These include baking, boiling, grilling, and broiling.  Eat more home-cooked foods. Eat at restaurants and buffets less often.  Avoid cooking using saturated fats, such as butter, cream, palm oil, palm kernel oil, and coconut oil.  Recommended foods    Fruits  All fresh, canned (in natural juice), or frozen fruits.  Vegetables  Fresh or frozen vegetables (raw, steamed, roasted, or grilled). Green salads.  Grains  Whole grains, such as whole wheat or whole grain " breads, crackers, cereals, and pasta. Unsweetened oatmeal, bulgur, barley, quinoa, or brown rice. Corn or whole wheat flour tortillas.  Meats and other protein foods  Ground beef (85% or leaner), grass-fed beef, or beef trimmed of fat. Skinless chicken or turkey. Ground chicken or turkey. Pork trimmed of fat. All fish and seafood. Egg whites. Dried beans, peas, or lentils. Unsalted nuts or seeds. Unsalted canned beans. Nut butters without added sugar or oil.  Dairy  Low-fat or nonfat dairy products, such as skim or 1% milk, 2% or reduced-fat cheeses, low-fat and fat-free ricotta or cottage cheese, or plain low-fat and nonfat yogurt.  Fats and oils  Tub margarine without trans fats. Light or reduced-fat mayonnaise and salad dressings. Avocado. Olive, canola, sesame, or safflower oils.  The items listed above may not be a complete list of foods and beverages you can eat. Contact a dietitian for more information.  Foods to avoid  Fruits  Canned fruit in heavy syrup. Fruit in cream or butter sauce. Fried fruit.  Vegetables  Vegetables cooked in cheese, cream, or butter sauce. Fried vegetables.  Grains  White bread. White pasta. White rice. Cornbread. Bagels, pastries, and croissants. Crackers and snack foods that contain trans fat and hydrogenated oils.  Meats and other protein foods  Fatty cuts of meat. Ribs, chicken wings, barron, sausage, bologna, salami, chitterlings, fatback, hot dogs, bratwurst, and packaged lunch meats. Liver and organ meats. Whole eggs and egg yolks. Chicken and turkey with skin. Fried meat.  Dairy  Whole or 2% milk, cream, half-and-half, and cream cheese. Whole milk cheeses. Whole-fat or sweetened yogurt. Full-fat cheeses. Nondairy creamers and whipped toppings. Processed cheese, cheese spreads, and cheese curds.  Beverages  Alcohol. Sugar-sweetened drinks such as sodas, lemonade, and fruit drinks.  Fats and oils  Butter, stick margarine, lard, shortening, ghee, or barron fat. Coconut, palm  kernel, and palm oils.  Sweets and desserts  Corn syrup, sugars, honey, and molasses. Candy. Jam and jelly. Syrup. Sweetened cereals. Cookies, pies, cakes, donuts, muffins, and ice cream.  The items listed above may not be a complete list of foods and beverages you should avoid. Contact a dietitian for more information.  Summary  Choosing the right foods helps keep your fat and cholesterol at normal levels. This can keep you from getting certain diseases.  At meals, fill one-half of your plate with vegetables and green salads.  Eat high-fiber foods, like whole grains, beans, apples, carrots, peas, and barley.  Limit added sugar, saturated fats, alcohol, and fried foods.  This information is not intended to replace advice given to you by your health care provider. Make sure you discuss any questions you have with your health care provider.  Document Revised: 04/21/2021 Document Reviewed: 04/21/2021  [employer] Patient Education © 2021 [employer]    Limit fried foods.  Cook foods without frying them. Baking, boiling, grilling, and broiling are all great options.  Lose weight if you are overweight. Losing even a small amount of weight can help your overall health. It can also help prevent diseases such as diabetes and heart disease.  WHAT FOODS CAN I EAT?  Grains  Whole grains, such as whole wheat or whole grain breads, crackers, cereals, and pasta. Unsweetened oatmeal, bulgur, barley, quinoa, or brown rice. Corn or whole wheat flour tortillas.  Vegetables  Fresh or frozen vegetables (raw, steamed, roasted, or grilled). Green salads.  Fruits  All fresh, canned (in natural juice), or frozen fruits.  Meat and Other Protein Products  Ground beef (85% or leaner), grass-fed beef, or beef trimmed of fat. Skinless chicken or turkey. Ground chicken or turkey. Pork trimmed of fat. All fish and seafood. Eggs. Dried beans, peas, or lentils. Unsalted nuts or seeds. Unsalted canned or dry beans.  Dairy  Low-fat dairy products,  such as skim or 1% milk, 2% or reduced-fat cheeses, low-fat ricotta or cottage cheese, or plain low-fat yogurt.  Fats and Oils  Tub margarines without trans fats. Light or reduced-fat mayonnaise and salad dressings. Avocado. Olive, canola, sesame, or safflower oils. Natural peanut or almond butter (choose ones without added sugar and oil).  The items listed above may not be a complete list of recommended foods or beverages. Contact your dietitian for more options.  WHAT FOODS ARE NOT RECOMMENDED?  Grains  White bread. White pasta. White rice. Cornbread. Bagels, pastries, and croissants. Crackers that contain trans fat.  Vegetables  White potatoes. Corn. Creamed or fried vegetables. Vegetables in a cheese sauce.  Fruits  Dried fruits. Canned fruit in light or heavy syrup. Fruit juice.  Meat and Other Protein Products  Fatty cuts of meat. Ribs, chicken wings, barron, sausage, bologna, salami, chitterlings, fatback, hot dogs, bratwurst, and packaged luncheon meats. Liver and organ meats.  Dairy  Whole or 2% milk, cream, half-and-half, and cream cheese. Whole milk cheeses. Whole-fat or sweetened yogurt. Full-fat cheeses. Nondairy creamers and whipped toppings. Processed cheese, cheese spreads, or cheese curds.  Sweets and Desserts  Corn syrup, sugars, honey, and molasses. Candy. Jam and jelly. Syrup. Sweetened cereals. Cookies, pies, cakes, donuts, muffins, and ice cream.  Fats and Oils  Butter, stick margarine, lard, shortening, ghee, or barron fat. Coconut, palm kernel, or palm oils.  Beverages  Alcohol. Sweetened drinks (such as sodas, lemonade, and fruit drinks or punches).  The items listed above may not be a complete list of foods and beverages to avoid. Contact your dietitian for more information.  Document Released: 06/18/2013 Document Revised: 08/21/2015 Document Reviewed: 03/19/2015  [employer]® Patient Information ©2015 [employer], [facility]. This information is not intended to replace advice given to you by your health  care provider. Make sure you discuss any questions you have with your health care provider.

## 2023-12-04 NOTE — ASSESSMENT & PLAN NOTE
Lipids controlled.  Due to elevation of CPK, and ongoing elevation of triglycerides will begin Vascepa 2 g twice daily.  We will stop rosuvastatin.

## 2023-12-05 ENCOUNTER — TREATMENT (OUTPATIENT)
Dept: PHYSICAL THERAPY | Facility: CLINIC | Age: 38
End: 2023-12-05
Payer: COMMERCIAL

## 2023-12-05 DIAGNOSIS — M54.31 SCIATICA OF RIGHT SIDE: ICD-10-CM

## 2023-12-05 DIAGNOSIS — M70.61 GREATER TROCHANTERIC BURSITIS OF RIGHT HIP: Primary | ICD-10-CM

## 2023-12-05 PROCEDURE — 97110 THERAPEUTIC EXERCISES: CPT | Performed by: PHYSICAL THERAPIST

## 2023-12-05 PROCEDURE — 97530 THERAPEUTIC ACTIVITIES: CPT | Performed by: PHYSICAL THERAPIST

## 2023-12-05 NOTE — PROGRESS NOTES
Physical Therapy Daily Treatment Note  [address], [address]  [place] [address]      Patient: Gregg Zamorano   : 1985  Referring practitioner: Atif Hernandez MD  Date of Initial Visit: Type: THERAPY  Noted: 2023  Today's Date: 2023  Patient seen for 2 sessions       Visit Diagnoses:    ICD-10-CM ICD-9-CM   1. Greater trochanteric bursitis of right hip  M70.61 726.5   2. Sciatica of right side  M54.31 724.3           Subjective   Patient reports that the hip is doing better.  Currently denies pain.    Objective   See Exercise, Manual, and Modality Logs for complete treatment.       Assessment/Plan  Subjective reports are improved from the previous visit.  Added PPT, hip abduction, clams and prone SLR to the routine.  Patient tolerated the initiation of core and hip strengthening exercises very well, no significant c/o pain in the right hip or LE with the routine.      Timed:         Manual Therapy:    0     mins  60168;     Therapeutic Exercise:    15     mins  23723;     Neuromuscular Felisha:    0    mins  78704;    Therapeutic Activity:     8     mins  69933;     Gait Training      0    mins  14834;  Work Conditioning     0   mins  38263       Untimed:  Electrical Stimulation:    0     mins  66521 ( );      Timed Treatment:   23   mins   Total Treatment:     23   mins    Luis Alfredo Parrish, PT  [place] License: [id number]

## 2023-12-12 ENCOUNTER — TREATMENT (OUTPATIENT)
Dept: PHYSICAL THERAPY | Facility: CLINIC | Age: 38
End: 2023-12-12
Payer: COMMERCIAL

## 2023-12-12 DIAGNOSIS — M70.61 GREATER TROCHANTERIC BURSITIS OF RIGHT HIP: Primary | ICD-10-CM

## 2023-12-12 DIAGNOSIS — M54.31 SCIATICA OF RIGHT SIDE: ICD-10-CM

## 2023-12-12 PROCEDURE — 97110 THERAPEUTIC EXERCISES: CPT | Performed by: PHYSICAL THERAPIST

## 2023-12-12 PROCEDURE — 97530 THERAPEUTIC ACTIVITIES: CPT | Performed by: PHYSICAL THERAPIST

## 2023-12-12 NOTE — PROGRESS NOTES
Physical Therapy Daily Treatment Note  [address], [address]  [place] [address]      Patient: Gregg Zamorano   : 1985  Referring practitioner: Atif Hernandez MD  Date of Initial Visit: Type: THERAPY  Noted: 2023  Today's Date: 2023  Patient seen for 3 sessions       Visit Diagnoses:    ICD-10-CM ICD-9-CM   1. Greater trochanteric bursitis of right hip  M70.61 726.5   2. Sciatica of right side  M54.31 724.3          Subjective   Patient reports that the hip is doing better, reports that the flare ups are less frequent now.  Currently denies pain.    Objective   See Exercise, Manual, and Modality Logs for complete treatment.       Assessment/Plan  Subjective reports continue to improve.  Added bridges and standing hip abduction to the routine, in addition to increasing some of the previous exercises.  Patient tolerated the increase in the routine very well, no reports of pain in the right hip.      Timed:         Manual Therapy:    0     mins  67237;     Therapeutic Exercise:    20     mins  09835;     Neuromuscular Felisha:    0    mins  15834;    Therapeutic Activity:     8     mins  31388;     Gait Training      0    mins  45319;  Work Conditioning     0   mins  01697       Untimed:  Electrical Stimulation:    0     mins  20084 ( );      Timed Treatment:   28   mins   Total Treatment:     28   mins    Luis Alfredo Parrish, PT  [place] License: [id number]

## 2023-12-19 ENCOUNTER — TREATMENT (OUTPATIENT)
Dept: PHYSICAL THERAPY | Facility: CLINIC | Age: 38
End: 2023-12-19
Payer: COMMERCIAL

## 2023-12-19 DIAGNOSIS — M70.61 GREATER TROCHANTERIC BURSITIS OF RIGHT HIP: Primary | ICD-10-CM

## 2023-12-19 DIAGNOSIS — M54.31 SCIATICA OF RIGHT SIDE: ICD-10-CM

## 2023-12-19 PROCEDURE — 97110 THERAPEUTIC EXERCISES: CPT | Performed by: PHYSICAL THERAPIST

## 2023-12-19 PROCEDURE — 97530 THERAPEUTIC ACTIVITIES: CPT | Performed by: PHYSICAL THERAPIST

## 2023-12-19 NOTE — PROGRESS NOTES
Physical Therapy Daily Treatment Note  [address] [facility] [address], [address]  [place] [address]      Patient: Gregg Zamorano   : 1985  Referring practitioner: Atif Hernandez MD  Date of Initial Visit: Type: THERAPY  Noted: 2023  Today's Date: 2023  Patient seen for 4 sessions       Visit Diagnoses:    ICD-10-CM ICD-9-CM   1. Greater trochanteric bursitis of right hip  M70.61 726.5   2. Sciatica of right side  M54.31 724.3           Subjective   Patient reports that the hip is better, currently denies pain.  States that the extreme pain is gone and that it only flares up a couple times a day (as opposed to being flared up constantly for half the day, like it was before starting PT).    Objective   See Exercise, Manual, and Modality Logs for complete treatment.       Assessment/Plan  Subjective reports continue to be much improved since beginning PT.  Feel that the patient can continue with the HEP at this time and he was agreeable to that.  Added squats to the routine, in addition to increasing some of the previous exercises.  Patient performed the routine without visual or verbal signs of pain in the right hip.      Timed:         Manual Therapy:    0     mins  39219;     Therapeutic Exercise:    21     mins  47313;     Neuromuscular Felisha:    0    mins  67614;    Therapeutic Activity:     8     mins  88205;     Gait Training      0    mins  28305;  Work Conditioning     0   mins  63419       Untimed:  Electrical Stimulation:    0     mins  78521 ( );      Timed Treatment:   29   mins   Total Treatment:     29   mins    Luis Alfredo Parrish, PT  [place] License: [id number]

## 2024-01-10 ENCOUNTER — DOCUMENTATION (OUTPATIENT)
Dept: PHYSICAL THERAPY | Facility: CLINIC | Age: 39
End: 2024-01-10
Payer: COMMERCIAL

## 2024-04-01 ENCOUNTER — TELEPHONE (OUTPATIENT)
Dept: ORTHOPEDIC SURGERY | Facility: CLINIC | Age: 39
End: 2024-04-01
Payer: COMMERCIAL

## 2024-04-01 NOTE — TELEPHONE ENCOUNTER
Caller: YAZAN WRIGHT    Relationship: PATIENT     Best call back number:[phone]    What orders are you requesting (i.e. lab or imaging): MRI R HIP    In what timeframe would the patient need to come in: ASAP     Where will you receive your lab/imaging services:      Additional notes: LAST VISIT 11/13/2023

## 2024-04-01 NOTE — TELEPHONE ENCOUNTER
Spoke to patient and let him know Dr. Hernandez was out of the office this week and would address his request when he was back in the office and he voiced understanding and stated that was fine

## 2024-04-05 NOTE — TELEPHONE ENCOUNTER
Tried to call patient. Did not answer and VM is full. Please schedule patient with Dr. Hernandez if he calls back

## 2025-01-17 DIAGNOSIS — I10 ESSENTIAL HYPERTENSION: ICD-10-CM

## 2025-01-17 NOTE — TELEPHONE ENCOUNTER
Caller: Gregg Zamorano    Relationship: Self    Best call back number: [phone]     Requested Prescriptions:   Requested Prescriptions     Pending Prescriptions Disp Refills    amLODIPine (NORVASC) 10 MG tablet 90 tablet 1     Sig: Take 1 tablet by mouth Every Night for 180 days.    metoprolol succinate XL (TOPROL-XL) 100 MG 24 hr tablet 90 tablet 1     Sig: Take 1 tablet by mouth Every Night for 180 days.        Pharmacy where request should be sent: [facility][id number] [address] - [phone] [facility] [phone] FX     Last office visit with prescribing clinician: 12/4/2023   Last telemedicine visit with prescribing clinician: Visit date not found   Next office visit with prescribing clinician: 5/22/2025     Additional details provided by patient: PATIENT IS OUT OF THIS MEDICATION.     PATIENT SCHEDULED TO SEE PROVIDER IN MAY.     Does the patient have less than a 3 day supply:  [x] Yes  [] No    Would you like a call back once the refill request has been completed: [] Yes [x] No    If the office needs to give you a call back, can they leave a voicemail: [] Yes [x] No    Em Dwyer Rep   01/17/25 16:14 EST

## 2025-01-20 RX ORDER — METOPROLOL SUCCINATE 100 MG/1
100 TABLET, EXTENDED RELEASE ORAL NIGHTLY
Qty: 30 TABLET | Refills: 0 | Status: SHIPPED | OUTPATIENT
Start: 2025-01-20 | End: 2025-07-19

## 2025-01-20 RX ORDER — AMLODIPINE BESYLATE 10 MG/1
10 TABLET ORAL NIGHTLY
Qty: 30 TABLET | Refills: 0 | Status: SHIPPED | OUTPATIENT
Start: 2025-01-20 | End: 2025-07-19

## 2025-02-16 DIAGNOSIS — I10 ESSENTIAL HYPERTENSION: ICD-10-CM

## 2025-02-17 RX ORDER — METOPROLOL SUCCINATE 100 MG/1
100 TABLET, EXTENDED RELEASE ORAL NIGHTLY
Qty: 90 TABLET | Refills: 0 | Status: SHIPPED | OUTPATIENT
Start: 2025-02-17 | End: 2025-08-16

## 2025-02-17 RX ORDER — AMLODIPINE BESYLATE 10 MG/1
10 TABLET ORAL NIGHTLY
Qty: 90 TABLET | Refills: 0 | Status: SHIPPED | OUTPATIENT
Start: 2025-02-17 | End: 2025-08-16

## 2025-05-22 ENCOUNTER — OFFICE VISIT (OUTPATIENT)
Dept: FAMILY MEDICINE CLINIC | Facility: CLINIC | Age: 40
End: 2025-05-22
Payer: COMMERCIAL

## 2025-05-22 VITALS
BODY MASS INDEX: 38.36 KG/M2 | HEIGHT: 76 IN | OXYGEN SATURATION: 98 % | HEART RATE: 88 BPM | SYSTOLIC BLOOD PRESSURE: 122 MMHG | DIASTOLIC BLOOD PRESSURE: 84 MMHG | WEIGHT: 315 LBS

## 2025-05-22 DIAGNOSIS — E78.2 MIXED HYPERLIPIDEMIA: ICD-10-CM

## 2025-05-22 DIAGNOSIS — Z13.6 SCREENING FOR ISCHEMIC HEART DISEASE: ICD-10-CM

## 2025-05-22 DIAGNOSIS — Z00.00 ENCOUNTER FOR WELLNESS EXAMINATION IN ADULT: Primary | ICD-10-CM

## 2025-05-22 DIAGNOSIS — I10 ESSENTIAL HYPERTENSION: ICD-10-CM

## 2025-05-22 PROCEDURE — 99395 PREV VISIT EST AGE 18-39: CPT | Performed by: FAMILY MEDICINE

## 2025-05-22 RX ORDER — ICOSAPENT ETHYL 1 G/1
2 CAPSULE ORAL 2 TIMES DAILY WITH MEALS
Qty: 360 CAPSULE | Refills: 3 | Status: SHIPPED | OUTPATIENT
Start: 2025-05-22 | End: 2026-05-22

## 2025-05-22 RX ORDER — DOXYCYCLINE 100 MG/1
100 CAPSULE ORAL 2 TIMES DAILY
COMMUNITY
Start: 2025-05-16

## 2025-05-22 RX ORDER — AMLODIPINE BESYLATE 10 MG/1
10 TABLET ORAL NIGHTLY
Qty: 90 TABLET | Refills: 3 | Status: SHIPPED | OUTPATIENT
Start: 2025-05-22 | End: 2026-05-22

## 2025-05-22 RX ORDER — METOPROLOL SUCCINATE 100 MG/1
100 TABLET, EXTENDED RELEASE ORAL NIGHTLY
Qty: 90 TABLET | Refills: 3 | Status: SHIPPED | OUTPATIENT
Start: 2025-05-22 | End: 2026-05-22

## 2025-05-22 NOTE — ASSESSMENT & PLAN NOTE
Mixed hyperlipidemia status is uncertain until labs are due.  He will continue Vascepa.  Previously he was on rosuvastatin but we will obtain coronary calcium screening testing prior to reimplementing any additional therapy for cholesterol.  Previously the statin therapy caused elevated creatinine kinase levels.  Orders:    icosapent ethyl (Vascepa) 1 g capsule capsule; Take 2 g by mouth 2 (Two) Times a Day With Meals.    CK; Future    Lipid Panel With / Chol / HDL Ratio; Future

## 2025-05-22 NOTE — PROGRESS NOTES
Chief Complaint  Physical and Shoulder Pain (right)    Subjective        HPI      Patient presents for annual wellness visit.    The patient reports no adverse effects from current medications, which include amlodipine, Vascepa, and metoprolol. Rosuvastatin was discontinued due to elevated creatinine levels. There have been no recent labs, and the patient is not fasting. The patient is interested in coronary calcium screening. There are no respiratory, chest, gastrointestinal, urinary issues, headaches, dizziness, or mood disorders. The patient is concerned about weight management and has stopped regular gym workouts due to shoulder discomfort. Despite this, the patient reports good energy levels and no visual disturbances, although they may need new contact lenses.    The patient has been experiencing shoulder pain for 3 months, which disrupts sleep and is localized to the posterior shoulder. Despite the pain, the patient has a full range of motion and is able to scrub their back. The patient prefers to wait before considering physical therapy.    SOCIAL HISTORY  Quit smoking in 2019. Barely drinks alcohol. Tried marijuana in the past, not currently.  at  [employer] for 4.5 years.  for 13 years, together for 22 years, 2 sons (ages 12 and 17). Hobbies: hiking, basketball.    ALLERGIES  Intolerance to rosuvastatin.    MEDICATIONS  Current: amlodipine, Vascepa, metoprolol.  Discontinued: rosuvastatin.    IMMUNIZATIONS  Immunizations are up to date.     Review of Systems   Constitutional:  Negative for fatigue.   HENT: Negative.     Eyes: Negative.    Respiratory: Negative.     Cardiovascular: Negative.    Gastrointestinal: Negative.    Genitourinary:  Negative for difficulty urinating.   Musculoskeletal:         See HPI   Neurological:  Negative for dizziness.   Psychiatric/Behavioral:  Negative for dysphoric mood.    All other systems reviewed and are negative.       Vital Signs:   Vitals:     "05/22/25 1257   BP: 122/84   Pulse: 88   SpO2: 98%   Weight: (!) 159 kg (350 lb)   Height: 193 cm (76\")   PainSc: 3    PainLoc: Shoulder  Comment: right            5/22/2025     1:01 PM   PHQ-2/PHQ-9 Depression Screening   Little interest or pleasure in doing things Not at all   Feeling down, depressed, or hopeless Not at all               Physical Exam  Constitutional:       Appearance: Normal appearance. He is well-developed. He is obese.   HENT:      Head: Normocephalic.      Right Ear: Hearing, tympanic membrane and external ear normal.      Left Ear: Hearing, tympanic membrane and external ear normal.   Eyes:      General: Lids are normal.      Conjunctiva/sclera: Conjunctivae normal.      Pupils: Pupils are equal, round, and reactive to light.      Funduscopic exam:     Right eye: No AV nicking or papilledema.         Left eye: No AV nicking or papilledema.   Neck:      Thyroid: No thyroid mass or thyromegaly.      Vascular: No carotid bruit or JVD.      Trachea: Trachea normal.   Cardiovascular:      Rate and Rhythm: Normal rate and regular rhythm.      Pulses: Normal pulses.      Heart sounds: Normal heart sounds. No murmur heard.  Pulmonary:      Effort: Pulmonary effort is normal.      Breath sounds: Normal breath sounds.   Musculoskeletal:         General: Normal range of motion.      Cervical back: Normal range of motion and neck supple.      Lumbar back: No bony tenderness.   Lymphadenopathy:      Cervical: No cervical adenopathy.      Upper Body:      Right upper body: No supraclavicular adenopathy.      Left upper body: No supraclavicular adenopathy.   Skin:     General: Skin is warm and dry.      Findings: No rash.      Nails: There is no clubbing.   Neurological:      Mental Status: He is alert and oriented to person, place, and time.      Cranial Nerves: No cranial nerve deficit.      Deep Tendon Reflexes:      Reflex Scores:       Patellar reflexes are 2+ on the right side and 2+ on the left " side.  Psychiatric:         Speech: Speech normal.         Behavior: Behavior normal.         Thought Content: Thought content normal.         Judgment: Judgment normal.                  Results  - Laboratory Studies:    - Creatinine kinase levels elevated in Nov 2023:      - 018 - 413 - 704                Assessment and Plan      1. Essential hypertension.  Well-managed with current medications. The patient should continue taking amlodipine and metoprolol. Refills have been sent to [facility] on [address].    2. Mixed hyperlipidemia.  The status is uncertain pending lab results. The patient should continue taking Vascepa. A coronary calcium screening has been ordered at [facility]. The patient had elevated creatinine kinase with previous statin therapy.    3. Shoulder pain.  The pain is likely due to a rotator cuff injury. The patient has a full range of motion but experiences pain, especially at night. The patient prefers to wait before starting physical therapy. They should return for evaluation if the pain persists into the fall. Rotator cuff exercises have been recommended.    4. Health maintenance.  The patient has been advised to follow a heart-healthy diet and engage in regular aerobic activity (30 minutes, 5 days a week). Weight loss has been recommended, and the use of apps for caloric monitoring has been suggested. Immunizations are up-to-date, but the patient should consider getting the COVID-19 vaccine and influenza vaccine in the fall.         Encounter for wellness examination in adult  Heart healthy diet and regular aerobic activity 30 minutes 5 days a week.  Weight loss recommended.  Recommendation for either weight watchers, or lose it nicholas or my fitness pal nicholas to track calories.  Immunizations up-to-date.  Patient could not get COVID-vaccine and he will think about.       Essential hypertension    Essential hypertension controlled.  Continue with amlodipine and metoprolol.  Orders:     amLODIPine (NORVASC) 10 MG tablet; Take 1 tablet by mouth Every Night.    metoprolol succinate XL (TOPROL-XL) 100 MG 24 hr tablet; Take 1 tablet by mouth Every Night.    Comprehensive Metabolic Panel; Future    CBC & Differential; Future    TSH; Future    Mixed hyperlipidemia     Mixed hyperlipidemia status is uncertain until labs are due.  He will continue Vascepa.  Previously he was on rosuvastatin but we will obtain coronary calcium screening testing prior to reimplementing any additional therapy for cholesterol.  Previously the statin therapy caused elevated creatinine kinase levels.  Orders:    icosapent ethyl (Vascepa) 1 g capsule capsule; Take 2 g by mouth 2 (Two) Times a Day With Meals.    CK; Future    Lipid Panel With / Chol / HDL Ratio; Future    Screening for ischemic heart disease    Orders:    CT Cardiac Calcium Score Without Dye; Future       Return in about 1 year (around 5/22/2026) for Adult wellness & medication appt, schedule 30 min.     Patient was given instructions and counseling regarding his condition or for health maintenance advice. Please see specific information pulled into the AVS if appropriate.     Patient or patient representative verbalized consent for the use of Ambient Listening during the visit with  Juan Luna MD for chart documentation. 5/22/2025  13:29 EDT

## 2025-05-22 NOTE — ASSESSMENT & PLAN NOTE
Essential hypertension controlled.  Continue with amlodipine and metoprolol.  Orders:    amLODIPine (NORVASC) 10 MG tablet; Take 1 tablet by mouth Every Night.    metoprolol succinate XL (TOPROL-XL) 100 MG 24 hr tablet; Take 1 tablet by mouth Every Night.    Comprehensive Metabolic Panel; Future    CBC & Differential; Future    TSH; Future

## 2025-05-22 NOTE — PATIENT INSTRUCTIONS
Call [facility] to schedule your Low Dose Coronary Calcium Screening test:  [phone]  Address; [address] [place] [address]     Mediterranean Diet  A Mediterranean diet is based on the traditions of countries on the [place]. It focuses on eating more:  Fruits and vegetables.  Whole grains, beans, nuts, and seeds.  Heart-healthy fats. These are fats that are good for your heart.  It involves eating less:  Dairy.  Meat and eggs.  Processed foods with added sugar, salt, and fat.  This type of diet can help prevent certain conditions. It can also improve outcomes if you have a long-term (chronic) disease, such as kidney or heart disease.  What are tips for following this plan?  Reading food labels  Check packaged foods for:  The serving size. For foods such as rice and pasta, the serving size is the amount of cooked product, not dry.  The total fat. Avoid foods with saturated fat or trans fat.  Added sugars, such as corn syrup.  Shopping    Try to have a balanced diet. Buy a variety of foods, such as:  Fresh fruits and vegetables. You may be able to get these from local [employer] markets. You can also buy them frozen.  Grains, beans, nuts, and seeds. Some of these can be bought in bulk.  Fresh seafood.  Poultry and eggs.  Low-fat dairy products.  Buy whole ingredients instead of foods that have already been packaged.  If you can't get fresh seafood, buy precooked frozen shrimp or canned fish, such as tuna, salmon, or sardines.  Stock your pantry so you always have certain foods on hand, such as olive oil, canned tuna, canned tomatoes, rice, pasta, and beans.  Cooking  Cook foods with extra-virgin olive oil instead of using butter or other vegetable oils.  Have meat as a side dish. Have vegetables or grains as your main dish. This means having meat in small portions or adding small amounts of meat to foods like pasta or stew.  Use beans or vegetables instead of meat in common dishes  like chili or lasagna.  Try out different cooking methods. Try roasting, broiling, steaming, and sautéing vegetables.  Add frozen vegetables to soups, stews, pasta, or rice.  Add nuts or seeds for added healthy fats and plant protein at each meal. You can add these to yogurt, salads, or vegetable dishes.  Marinate fish or vegetables using olive oil, lemon juice, garlic, and fresh herbs.  Meal planning  Plan to eat a vegetarian meal one day each week. Try to work up to two vegetarian meals, if possible.  Eat seafood two or more times a week.  Have healthy snacks on hand. These may include:  Vegetable sticks with hummus.  Greek yogurt.  Fruit and nut trail mix.  Eat balanced meals. These should include:  Fruit: 2-3 servings a day.  Vegetables: 4-5 servings a day.  Low-fat dairy: 2 servings a day.  Fish, poultry, or lean meat: 1 serving a day.  Beans and legumes: 2 or more servings a week.  Nuts and seeds: 1-2 servings a day.  Whole grains: 6-8 servings a day.  Extra-virgin olive oil: 3-4 servings a day.  Limit red meat and sweets to just a few servings a month.  Lifestyle    Try to cook and eat meals with your family.  Drink enough fluid to keep your pee (urine) pale yellow.  Be active every day. This includes:  Aerobic exercise, which is exercise that causes your heart to beat faster. Examples include running and swimming.  Leisure activities like gardening, walking, or housework.  Get 7-8 hours of sleep each night.  Drink red wine if your provider says you can. A glass of wine is 5 oz (150 mL). You may be allowed to have:  Up to 1 glass a day if you're female and not pregnant.  Up to 2 glasses a day if you're male.  What foods should I eat?  Fruits  Apples. Apricots. Avocado. Berries. Bananas. Cherries. Dates. Figs. Grapes. Kerry. Melon. Oranges. Peaches. Plums. Pomegranate.  Vegetables  Artichokes. Beets. Broccoli. Cabbage. Carrots. Eggplant. Green beans. Chard. Kale. Spinach. Onions. Leeks. Peas. Squash.  Tomatoes. Peppers. Radishes.  Grains  Whole-grain pasta. Brown rice. Bulgur wheat. Polenta. Couscous. Whole-wheat bread. Oatmeal. Quinoa.  Meats and other proteins  Beans. Almonds. Sunflower seeds. Pine nuts. Peanuts. Cod. [place]. Scallops. Shrimp. Tuna. Tilapia. Clams. Oysters. Eggs. Chicken or turkey without skin.  Dairy  Low-fat milk. Cheese. Greek yogurt.  Fats and oils  Extra-virgin olive oil. Avocado oil. Grapeseed oil.  Beverages  Water. Red wine. Herbal tea.  Sweets and desserts  Greek yogurt with honey. Baked apples. Poached pears. Trail mix.  Seasonings and condiments  Basil. Cilantro. Coriander. Cumin. Mint. Parsley. Lionel. Rosemary. Tarragon. Garlic. Oregano. Thyme. Pepper. Balsamic vinegar. Tahini. Hummus. Tomato sauce. Olives. Mushrooms.  The items listed above may not be all the foods and drinks you can have. Talk to a dietitian to learn more.  What foods should I limit?  This is a list of foods that should be eaten rarely.  Fruits  Fruit canned in syrup.  Vegetables  Deep-fried potatoes, like French fries.  Grains  Packaged pasta or rice dishes. Cereal with added sugar. Snacks with added sugar.  Meats and other proteins  Beef. Pork. Lamb. Chicken or turkey with skin. Hot dogs. Sanchez.  Dairy  Ice cream. Sour cream. Whole milk.  Fats and oils  Butter. Canola oil. Vegetable oil. Beef fat (tallow). Lard.  Beverages  Juice. Sugar-sweetened soft drinks. Beer. Liquor and spirits.  Sweets and desserts  Cookies. Cakes. Pies. Candy.  Seasonings and condiments  Mayonnaise. Pre-made sauces and marinades.  The items listed above may not be all the foods and drinks you should limit. Talk to a dietitian to learn more.  Where to find more information  American Heart Association (AHA): heart.org  This information is not intended to replace advice given to you by your health care provider. Make sure you discuss any questions you have with your health care provider.  Document Revised: 03/31/2024 Document Reviewed:  03/31/2024  [employer] Patient Education © 2024 [employer]       Exercising to Lose Weight  Getting regular exercise is important for everyone. It is especially important if you are overweight. Being overweight increases your risk of heart disease, stroke, diabetes, high blood pressure, and several types of cancer. Exercising, and reducing the calories you consume, can help you lose weight and improve fitness and health.  Exercise can be moderate or vigorous intensity. To lose weight, most people need to do a certain amount of moderate or vigorous-intensity exercise each week.  How can exercise affect me?  You lose weight when you exercise enough to burn more calories than you eat. Exercise also reduces body fat and builds muscle. The more muscle you have, the more calories you burn. Exercise also:  Improves mood.  Reduces stress and tension.  Improves your overall fitness, flexibility, and endurance.  Increases bone strength.  Moderate-intensity exercise    Moderate-intensity exercise is any activity that gets you moving enough to burn at least three times more energy (calories) than if you were sitting.  Examples of moderate exercise include:  Walking a mile in 15 minutes.  Doing light yard work.  Biking at an easy pace.  Most people should get at least 150 minutes of moderate-intensity exercise a week to maintain their body weight.  Vigorous-intensity exercise  Vigorous-intensity exercise is any activity that gets you moving enough to burn at least six times more calories than if you were sitting. When you exercise at this intensity, you should be working hard enough that you are not able to carry on a conversation.  Examples of vigorous exercise include:  Running.  Playing a team sport, such as football, basketball, and soccer.  Jumping rope.  Most people should get at least 75 minutes a week of vigorous exercise to maintain their body weight.  What actions can I take to lose weight?  The amount of exercise you  need to lose weight depends on:  Your age.  The type of exercise.  Any health conditions you have.  Your overall physical ability.  Talk to your health care provider about how much exercise you need and what types of activities are safe for you.  Nutrition    Make changes to your diet as told by your health care provider or diet and nutrition specialist (dietitian). This may include:  Eating fewer calories.  Eating more protein.  Eating less unhealthy fats.  Eating a diet that includes fresh fruits and vegetables, whole grains, low-fat dairy products, and lean protein.  Avoiding foods with added fat, salt, and sugar.  Drink plenty of water while you exercise to prevent dehydration or heat stroke.  Activity  Choose an activity that you enjoy and set realistic goals. Your health care provider can help you make an exercise plan that works for you.  Exercise at a moderate or vigorous intensity most days of the week.  The intensity of exercise may vary from person to person. You can tell how intense a workout is for you by paying attention to your breathing and heartbeat. Most people will notice their breathing and heartbeat get faster with more intense exercise.  Do resistance training twice each week, such as:  Push-ups.  Sit-ups.  Lifting weights.  Using resistance bands.  Getting short amounts of exercise can be just as helpful as long, structured periods of exercise. If you have trouble finding time to exercise, try doing these things as part of your daily routine:  Get up, stretch, and walk around every 30 minutes throughout the day.  Go for a walk during your lunch break.  Park your car farther away from your destination.  If you take public transportation, get off one stop early and walk the rest of the way.  Make phone calls while standing up and walking around.  Take the stairs instead of elevators or escalators.  Wear comfortable clothes and shoes with good support.  Do not exercise so much that you hurt  yourself, feel dizzy, or get very short of breath.  Where to find more information  U.S. Department of Health and Human Services: www.hhs.gov  Centers for Disease Control and Prevention: www.cdc.gov  Contact a health care provider:  Before starting a new exercise program.  If you have questions or concerns about your weight.  If you have a medical problem that keeps you from exercising.  Get help right away if:  You have any of the following while exercising:  Injury.  Dizziness.  Difficulty breathing or shortness of breath that does not go away when you stop exercising.  Chest pain.  Rapid heartbeat.  These symptoms may represent a serious problem that is an emergency. Do not wait to see if the symptoms will go away. Get medical help right away. Call your local emergency services (911 in the [place] Do not drive yourself to the hospital.  Summary  Getting regular exercise is especially important if you are overweight.  Being overweight increases your risk of heart disease, stroke, diabetes, high blood pressure, and several types of cancer.  Losing weight happens when you burn more calories than you eat.  Reducing the amount of calories you eat, and getting regular moderate or vigorous exercise each week, helps you lose weight.  This information is not intended to replace advice given to you by your health care provider. Make sure you discuss any questions you have with your health care provider.  Document Revised: 02/13/2022 Document Reviewed: 02/13/2022  Elsevier Patient Education © 2024 [employer]vier Inc.

## 2025-05-28 ENCOUNTER — RESULTS FOLLOW-UP (OUTPATIENT)
Dept: FAMILY MEDICINE CLINIC | Facility: CLINIC | Age: 40
End: 2025-05-28
Payer: COMMERCIAL

## 2025-05-28 NOTE — LETTER
Gregg Jaun  [address] [place] [id number]    May 28, 2025     Dear Mr. Zamorano:    Below are the results from your recent visit:    Resulted Orders   Comprehensive Metabolic Panel   Result Value Ref Range    Glucose 91 65 - 99 mg/dL    BUN 9.0 6.0 - 20.0 mg/dL    Creatinine 0.74 (L) 0.76 - 1.27 mg/dL    EGFR Result 118.2 >60.0 mL/min/1.73      Comment:      GFR Categories in Chronic Kidney Disease (CKD)  GFR Category          GFR (mL/min/1.73)    Interpretation  G1                    90 or greater        Normal or high  (1)  G2                    60-89                Mild decrease  (1)  G3a                   45-59                Mild to moderate  decrease  G3b                   30-44                Moderate to  severe decrease  G4                    15-29                Severe decrease  G5                    14 or less           Kidney failure  (1)In the absence of evidence of kidney disease, neither  GFR category G1 or G2 fulfill the criteria for CKD.  eGFR calculation 2021 CKD-EPI creatinine equation, which  does not include race as a factor      BUN/Creatinine Ratio 12.2 7.0 - 25.0    Sodium 140 136 - 145 mmol/L    Potassium 4.8 3.5 - 5.2 mmol/L    Chloride 102 98 - 107 mmol/L    Total CO2 26.7 22.0 - 29.0 mmol/L    Calcium 9.8 8.6 - 10.5 mg/dL    Total Protein 7.3 6.0 - 8.5 g/dL    Albumin 4.4 3.5 - 5.2 g/dL    Globulin 2.9 gm/dL    A/G Ratio 1.5 g/dL    Total Bilirubin 0.3 0.0 - 1.2 mg/dL    Alkaline Phosphatase 91 39 - 117 U/L    AST (SGOT) 26 1 - 40 U/L    ALT (SGPT) 35 1 - 41 U/L   CBC & Differential   Result Value Ref Range    WBC 8.64 3.40 - 10.80 10*3/mm3    RBC 5.82 (H) 4.14 - 5.80 10*6/mm3    Hemoglobin 15.2 13.0 - 17.7 g/dL    Hematocrit 46.7 37.5 - 51.0 %    MCV 80.2 79.0 - 97.0 fL    MCH 26.1 (L) 26.6 - 33.0 pg    MCHC 32.5 31.5 - 35.7 g/dL    RDW 14.3 12.3 - 15.4 %    Platelets 303 140 - 450 10*3/mm3    Neutrophil Rel % 60.9 42.7 - 76.0 %    Lymphocyte Rel % 29.4 19.6 - 45.3 %     Monocyte Rel % 5.3 5.0 - 12.0 %    Eosinophil Rel % 3.6 0.3 - 6.2 %    Basophil Rel % 0.6 0.0 - 1.5 %    Neutrophils Absolute 5.26 1.70 - 7.00 10*3/mm3    Lymphocytes Absolute 2.54 0.70 - 3.10 10*3/mm3    Monocytes Absolute 0.46 0.10 - 0.90 10*3/mm3    Eosinophils Absolute 0.31 0.00 - 0.40 10*3/mm3    Basophils Absolute 0.05 0.00 - 0.20 10*3/mm3    Immature Granulocyte Rel % 0.2 0.0 - 0.5 %    Immature Grans Absolute 0.02 0.00 - 0.05 10*3/mm3    nRBC 0.0 0.0 - 0.2 /100 WBC   CK   Result Value Ref Range    Creatine Kinase 588 (H) 20 - 200 U/L   Lipid Panel With / Chol / HDL Ratio   Result Value Ref Range    Total Cholesterol 225 (H) 0 - 200 mg/dL      Comment:      Cholesterol Reference Ranges  (U.S. Department of Health and Human Services ATP III  Classifications)  Desirable          <200 mg/dL  Borderline High    200-239 mg/dL  High Risk          >240 mg/dL  Triglyceride Reference Ranges  (U.S. Department of Health and Human Services ATP III  Classifications)  Normal           <150 mg/dL  Borderline High  150-199 mg/dL  High             200-499 mg/dL  Very High        >500 mg/dL  HDL Reference Ranges  (U.S. Department of Health and Human Services ATP III  Classifications)  Low     <40 mg/dl (major risk factor for CHD)  High    >60 mg/dl ('negative' risk factor for CHD)  LDL Reference Ranges  (U.S. Department of Health and Human Services ATP III  Classifications)  Optimal          <100 mg/dL  Near Optimal     100-129 mg/dL  Borderline High  130-159 mg/dL  High             160-189 mg/dL  Very High        >189 mg/dL  LDL is calculated using the NIH LDL-C calculation.      Triglycerides 262 (H) 0 - 150 mg/dL    HDL Cholesterol 42 40 - 60 mg/dL    VLDL Cholesterol Doyle 47 (H) 5 - 40 mg/dL    LDL Chol Calc ([facility]) 136 (H) 0 - 100 mg/dL    Chol/HDL Ratio 5.36    TSH   Result Value Ref Range    TSH 4.630 (H) 0.270 - 4.200 uIU/mL         Gregg, your labs show some abnormalities.  The creatinine kinase which is an indicator of  muscle tissue problems, is once again elevated.  If we need to treat your cholesterol certainly we want to stay away from rosuvastatin.  Make sure you stay well-hydrated.  Try to avoid ibuprofen or Aleve type products is much as possible.  I am still waiting on the low-dose coronary calcium scan test results.  Most likely you will need a product called Repatha for treatment of your cholesterol which is elevated on this lab test.  The remainder of the labs are fairly satisfactory and do not require any further evaluation or treatment at this point.  For instance the mild elevation of thyroid-stimulating hormone (TSH) just needs to be monitored with yearly labs.  I will respond with the different communication when I see the low-dose coronary calcium test result.                    Sincerely,        Juan Luna MD

## 2025-05-28 NOTE — PROGRESS NOTES
Please give the patient the following message:  Gregg, your labs show some abnormalities.  The creatinine kinase which is an indicator of muscle tissue problems, is once again elevated.  If we need to treat your cholesterol certainly we want to stay away from rosuvastatin.  Make sure you stay well-hydrated.  Try to avoid ibuprofen or Aleve type products is much as possible.  I am still waiting on the low-dose coronary calcium scan test results.  Most likely you will need a product called Repatha for treatment of your cholesterol which is elevated on this lab test.  The remainder of the labs are fairly satisfactory and do not require any further evaluation or treatment at this point.  For instance the mild elevation of thyroid-stimulating hormone (TSH) just needs to be monitored with yearly labs.  I will respond with the different communication when I see the low-dose coronary calcium test result.  Dr. Luna